# Patient Record
Sex: MALE | Race: WHITE | NOT HISPANIC OR LATINO | Employment: FULL TIME | URBAN - METROPOLITAN AREA
[De-identification: names, ages, dates, MRNs, and addresses within clinical notes are randomized per-mention and may not be internally consistent; named-entity substitution may affect disease eponyms.]

---

## 2023-08-10 LAB — RETINOPATHY: NORMAL

## 2023-08-12 ENCOUNTER — TRANSFERRED RECORDS (OUTPATIENT)
Dept: MULTI SPECIALTY CLINIC | Facility: CLINIC | Age: 38
End: 2023-08-12

## 2024-02-19 ENCOUNTER — APPOINTMENT (OUTPATIENT)
Dept: MRI IMAGING | Facility: HOSPITAL | Age: 39
End: 2024-02-19
Attending: EMERGENCY MEDICINE
Payer: COMMERCIAL

## 2024-02-19 ENCOUNTER — HOSPITAL ENCOUNTER (EMERGENCY)
Facility: HOSPITAL | Age: 39
Discharge: HOME | End: 2024-02-20
Attending: EMERGENCY MEDICINE | Admitting: EMERGENCY MEDICINE
Payer: COMMERCIAL

## 2024-02-19 VITALS
OXYGEN SATURATION: 100 % | HEIGHT: 66 IN | SYSTOLIC BLOOD PRESSURE: 141 MMHG | TEMPERATURE: 98.3 F | HEART RATE: 100 BPM | BODY MASS INDEX: 36.96 KG/M2 | RESPIRATION RATE: 16 BRPM | WEIGHT: 230 LBS | DIASTOLIC BLOOD PRESSURE: 74 MMHG

## 2024-02-19 DIAGNOSIS — M54.12 CERVICAL RADICULOPATHY: ICD-10-CM

## 2024-02-19 PROCEDURE — 250N000011 HC RX IP 250 OP 636: Performed by: EMERGENCY MEDICINE

## 2024-02-19 PROCEDURE — 99285 EMERGENCY DEPT VISIT HI MDM: CPT | Mod: 25

## 2024-02-19 PROCEDURE — 72141 MRI NECK SPINE W/O DYE: CPT

## 2024-02-19 PROCEDURE — 96372 THER/PROPH/DIAG INJ SC/IM: CPT | Performed by: EMERGENCY MEDICINE

## 2024-02-19 RX ORDER — KETOROLAC TROMETHAMINE 30 MG/ML
30 INJECTION, SOLUTION INTRAMUSCULAR; INTRAVENOUS ONCE
Status: COMPLETED | OUTPATIENT
Start: 2024-02-19 | End: 2024-02-19

## 2024-02-19 RX ORDER — METHOCARBAMOL 500 MG/1
500 TABLET, FILM COATED ORAL 4 TIMES DAILY PRN
Qty: 30 TABLET | Refills: 0 | Status: SHIPPED | OUTPATIENT
Start: 2024-02-19 | End: 2024-03-19

## 2024-02-19 RX ORDER — METHYLPREDNISOLONE 4 MG
TABLET, DOSE PACK ORAL
Qty: 21 TABLET | Refills: 0 | Status: SHIPPED | OUTPATIENT
Start: 2024-02-19 | End: 2024-03-19

## 2024-02-19 RX ADMIN — KETOROLAC TROMETHAMINE 30 MG: 30 INJECTION, SOLUTION INTRAMUSCULAR; INTRAVENOUS at 21:44

## 2024-02-19 ASSESSMENT — ACTIVITIES OF DAILY LIVING (ADL)
ADLS_ACUITY_SCORE: 33
ADLS_ACUITY_SCORE: 35

## 2024-02-19 NOTE — Clinical Note
Harrison Tolbert was seen and treated in our emergency department on 2/19/2024.  He may return to work on 02/22/2024.       If you have any questions or concerns, please don't hesitate to call.      Denzel Narayan, DO

## 2024-02-20 ENCOUNTER — TELEPHONE (OUTPATIENT)
Dept: NEUROSURGERY | Facility: CLINIC | Age: 39
End: 2024-02-20
Payer: COMMERCIAL

## 2024-02-20 NOTE — TELEPHONE ENCOUNTER
[facility] Call Center    Phone Message    May a detailed message be left on voicemail: yes     Reason for Call: Other: Patient was returning a call regarding the time slot on hold. He called to confirm that time and date would work. When  tried to book slot they kept getting an error message. Please book patient for 2/22/24 and call back.      Action Taken: Other: Neurosurgery    Travel Screening: Not Applicable

## 2024-02-20 NOTE — ED TRIAGE NOTES
Pt c/o pain from right side of neck into shoulder. Pt states it began after he was shoveling. Pain is worse with movement and it is intermittently making hand numb. Pt is worse with movement of arm or neck. Took ibuprofen at 1430 without relief.

## 2024-02-20 NOTE — TELEPHONE ENCOUNTER
Date: 2/20/2024  (Provide the date when patient was called)  Provider: FERNANDO- Martha Esqueda  (with whom  should patient dinorah with)  Reason for out-going call: new consult- ED follow up for Cervical Foraminal narrowing/disk osteophyte  (Reason patient was contacted)    Detailed message: ldvm for patient to c/b and dinorah new patient appt with FERNANDO for this coming Thursday 2/22/2024 held slot for patient (will remove hold slot at the then of today) @ 1:45 pm for check in time and 2:00 pm for appt time.

## 2024-02-20 NOTE — ED NOTES
Pt discharged ambulatory to home at 0000. All questions answered. Pt expressed understanding of info

## 2024-02-20 NOTE — ED PROVIDER NOTES
EMERGENCY DEPARTMENT ENCOUNTER      NAME: Harrison Tolbert  AGE: 38 year old male  YOB: 1985  MRN: [id number]  EVALUATION DATE & TIME: 2024  8:55 PM    PCP: System, Provider Not In    ED PROVIDER: Denzel Narayan D.O.      Chief Complaint   Patient presents with    Neck Pain       FINAL IMPRESSION:  1. Cervical radiculopathy        ED COURSE & MEDICAL DECISION MAKIN:27 PM I met with the patient to gather history and to perform my initial exam. I discussed the plan for care while in the Emergency Department.  11:43 PM I spoke with Dr. Asencio from neurosurgery. She recommends prescribing a Medrol Dose pack, discharge, and follow up in the morning as an out patient.  11:47 PM We discussed the plan for discharge and the patient is agreeable. Reviewed supportive cares, symptomatic treatment, outpatient follow up, and reasons to return to the Emergency Department. All questions and concerns were addressed. Patient to be discharged by ED RN.           Pertinent Labs & Imaging studies reviewed. (See chart for details)  38 year old male presents to the Emergency Department for evaluation of neck pain with radiation down right arm with positive Spurling's test.  Initial differential included discitis, cervical radiculopathy, epidural abscess, epidural hematoma, other acute process.  No history of trauma that would suggest suspicion for fracture or need for CT cervical spine.  MRI imaging does not show obvious abscess or hematoma, nor does it show evidence of discitis, but does show obvious foraminal stenosis secondary to cervical disc that would explain his symptoms quite well.  Discussed patient with neurosurgery, and they will follow him up in clinic and recommended discharge on Medrol Dosepak.  Patient was comfortable with this plan.  Return precautions were discussed.    Medical Decision Making  Obtained supplemental history:Supplemental history obtained?: No  Reviewed external records:  External records reviewed?: No  Care impacted by chronic illness:N/A  Care significantly affected by social determinants of health:N/A  Did you consider but not order tests?: Work up considered but not performed and documented in chart, if applicable  Did you interpret images independently?: Independent interpretation of ECG and images noted in documentation, when applicable.  Consultation discussion with other provider:Did you involve another provider (consultant, , pharmacy, etc.)?: I discussed the care with another health care provider, see documentation for details.  Discharge. I prescribed additional prescription strength medication(s) as charted. See documentation for any additional details.    At the conclusion of the encounter I discussed the results of all of the tests and the disposition. The questions were answered. The patient or family acknowledged understanding and was agreeable with the care plan.        HPI    Patient information was obtained from: patient     Use of : N/A        Harrison Tolbert is a 38 year old male who presents with neck pain.    The patient presents with right sided neck pain, shoulder pain, and right finger tingling since 2/14 when he was shoveling snow. He has mid back pain as well if he tries to straighten his back. Today, the pain was bad enough that he had to leave work early and is concerned he will not be able to work if this pain continues. He took ibuprofen this morning.       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       REVIEW OF SYSTEMS  Constitutional:  Denies fever, chills, weight loss or weakness  Eyes:  No pain, discharge, redness  HENT:  Denies sore throat, ear pain, congestion. Positive for neck pain.   Respiratory: No SOB, wheeze or cough  Cardiovascular:  No CP, palpitations  GI:  Denies abdominal pain, nausea, vomiting, diarrhea  : Denies dysuria, hematuria  Musculoskeletal:  Denies any new swelling or loss of  "function.  Skin:  Denies rash, pallor  Neurologic:  Denies headache, focal weakness. Positive for tingling (right fingers)  Lymph: Denies swollen nodes    All other systems negative unless noted in HPI.    PAST MEDICAL HISTORY:  No past medical history on file.    PAST SURGICAL HISTORY:  No past surgical history on file.      CURRENT MEDICATIONS:    No current facility-administered medications for this encounter.     Current Outpatient Medications   Medication    methocarbamol (ROBAXIN) 500 MG tablet    methylPREDNISolone (MEDROL DOSEPAK) 4 MG tablet therapy pack         ALLERGIES:  No Known Allergies    FAMILY HISTORY:  No family history on file.    SOCIAL HISTORY:  Social History     Socioeconomic History    Marital status: Single       VITALS:  Patient Vitals for the past 24 hrs:   BP Temp Temp src Pulse Resp SpO2 Height Weight   02/19/24 2354 (!) 141/74 -- -- 100 16 100 % -- --   02/19/24 1950 (!) 146/76 98.3  F (36.8  C) Temporal 105 18 100 % 1.664 m (5' 5.5\") 104.3 kg (230 lb)       PHYSICAL EXAM    VITAL SIGNS: BP (!) 141/74   Pulse 100   Temp 98.3  F (36.8  C) (Temporal)   Resp 16   Ht 1.664 m (5' 5.5\")   Wt 104.3 kg (230 lb)   SpO2 100%   BMI 37.69 kg/m      Vitals: BP (!) 141/74   Pulse 100   Temp 98.3  F (36.8  C) (Temporal)   Resp 16   Ht 1.664 m (5' 5.5\")   Wt 104.3 kg (230 lb)   SpO2 100%   BMI 37.69 kg/m    General: Appears in no acute distress, awake, alert, interactive.  Eyes: Conjunctivae non-injected. Sclera anicteric.  HENT: Atraumatic, normocephalic Positive Spurling sign son the right.   Neck: Supple, normal ROM  Respiratory/Chest: Respiration unlabored, no tachypnea  Abdomen: non distended  Musculoskeletal: Normal extremities. No edema or erythema.  Skin: Normal color. No rash or diaphoresis.  Neurologic: Alert and oriented, face symmetric, moves all extremities spontaneously. Speech clear.  Psychiatric:  Affect normal, Judgment normal, Mood normal.        LABS  Results for " orders placed or performed during the hospital encounter of 02/19/24 (from the past 24 hour(s))   Cervical spine MRI w/o contrast    Narrative    EXAM: MR CERVICAL SPINE W/O CONTRAST  LOCATION: [facility]  DATE: 2/19/2024    INDICATION: Reproducible right sided cervical radiculopathy  COMPARISON: None.  TECHNIQUE: MRI Cervical Spine without IV contrast.    FINDINGS:   Vertebral body height and marrow signal is normal. There is slight retrolisthesis of C3, C4 and C5. No abnormal cord signal. No extraspinal abnormality.    Craniovertebral junction and C1-C2: Normal.    C2-C3: Normal disc height. No herniation. Normal facets. No spinal canal or neural foraminal stenosis.     C3-C4: Slight retrolisthesis of C3. Mild loss of disc height. Small central protrusion. Normal facets. No spinal canal or neural foraminal narrowing.     C4-C5: Slight loss of disc height. Small central disc protrusion. No spinal canal or neural foraminal narrowing.     C5-C6: Moderate loss of disc height. Slight retrolisthesis of C5. Shallow right central disc osteophyte complex. Right-sided uncinate spurring. There is normal facets. No spinal canal or left neural foraminal narrowing. Severe right neural foraminal   narrowing.     C6-C7: Normal disc height. No herniation. Normal facets. No spinal canal or neural foraminal stenosis.     C7-T1: Normal disc height. No herniation. Normal facets. No spinal canal or neural foraminal stenosis.      Impression    IMPRESSION:  1.  At C5-C6 there is severe right neural foraminal narrowing secondary to a shallow right central disc osteophyte complex and right-sided uncinate spurring. Correlate for right C6 radiculopathy.  2.  Minor degenerative changes elsewhere as described.           RADIOLOGY  Cervical spine MRI w/o contrast   Final Result   IMPRESSION:   1.  At C5-C6 there is severe right neural foraminal narrowing secondary to a shallow right central disc osteophyte complex and  right-sided uncinate spurring. Correlate for right C6 radiculopathy.   2.  Minor degenerative changes elsewhere as described.                    MEDICATIONS GIVEN IN THE EMERGENCY:  Medications   ketorolac (TORADOL) injection 30 mg (30 mg Intramuscular $Given 2/19/24 6003)       NEW PRESCRIPTIONS STARTED AT TODAY'S ER VISIT  Discharge Medication List as of 2/19/2024 11:58 PM        START taking these medications    Details   methocarbamol (ROBAXIN) 500 MG tablet Take 1 tablet (500 mg) by mouth 4 times daily as needed for muscle spasms, Disp-30 tablet, R-0, Local Print      methylPREDNISolone (MEDROL DOSEPAK) 4 MG tablet therapy pack Follow Package Directions, Disp-21 tablet, R-0, Local Print              I, Chris Sarmiento, am serving as a scribe to document services personally performed by Denzel Narayan D.O., based on my observations and the provider's statements to me.  I, Denzel Narayan D.O., attest that Chris Sarmiento is acting in a scribe capacity, has observed my performance of the services and has documented them in accordance with my direction.     Denzel Narayan D.O.  Emergency Medicine  [facility] EMERGENCY DEPARTMENT  [address] [place] [id number] [phone]  Dept: [phone]       Denzel Narayan,   02/20/24 0021

## 2024-02-20 NOTE — PROGRESS NOTES
Contacted regarding 37 yo male presenting to ER with right arm pain.  MRI reveals foraminal narrowing on the right at C5-C6 due to HNP/osteophyte    Cervical MRI:  IMPRESSION:  1.  At C5-C6 there is severe right neural foraminal narrowing secondary to a shallow right central disc osteophyte complex and right-sided uncinate spurring. Correlate for right C6 radiculopathy.  2.  Minor degenerative changes elsewhere as described.     RECOMMENDATIONS:  Okay to discharge from ER.  Medrol dose pack.  NS office will contact patient tomorrow for clinic appt for evaluation.    AMNA Lopez  [facility] Neurosurgery  [address] [address]    Tel [phone]  Pager [phone]

## 2024-02-20 NOTE — CONFIDENTIAL NOTE
NEUROSURGERY- NEW PREVISIT PLANNING       Record Status/Location     Referring Provider Referral   Denzel Narayan DO      Diagnosis Referral M54.12 (ICD-10-CM) - Cervical radiculopathy    MRI (HEAD, NECK, SPINE) Pacs Cervical 2/19/24 [facility] [place]   CT Na    X-ray Na    INJECTION Na    PHYSICAL THERAPY Na    SURGERY Na

## 2024-02-21 NOTE — PROGRESS NOTES
"[facility] Neurosurgery  Neurosurgery Clinic Visit      CC: Cervical Radiculopathy     Primary care Provider: System, Provider Not In    HPI: Harrison Tolbert is a 38 year old male history of right carpal tunnel release evaluated in the Emergency Department 2/19/2024 for neck pain, right shoulder pain, and right arm pain that began 2/14/2024 while shoveling snow. Patient had MRI completed in ED demonstrating C5-C6 right severe foraminal stenosis secondary to disc osteophyte. Patient was discharged from ED with MDP, Robaxin and advised to follow up in outpatient NSGY clinic.    Today patient has ongoing severe neck pain, right shoulder and right upper extremity pain. Currently on day 3 of MDP and has been taking Robaxin 4 times per day without improvement in symptoms. Pain stems from neck radiating to right shoulder, right arm and diffusely into right hand with paresthesias of the right hand. No left upper extremity symptoms. No overt weakness. No recent trauma/fall. Patient works as a  requiring occasional heavy lifting which he is unable to do due to the intensity of his pain.       No past medical history on file.    Past Medical History reviewed with patient during visit.    No past surgical history on file.  Past Surgical History reviewed with patient during visit.    Current Outpatient Medications   Medication    meloxicam (MOBIC) 15 MG tablet    methocarbamol (ROBAXIN) 500 MG tablet    methylPREDNISolone (MEDROL DOSEPAK) 4 MG tablet therapy pack     No current facility-administered medications for this visit.       No Known Allergies    Social History     Socioeconomic History    Marital status: Single       No family history on file.      ROS: 10 point ROS neg other than the symptoms noted above in the HPI.    Vital Signs:   /89 (BP Location: Left arm, Patient Position: Sitting, Cuff Size: Adult Regular)   Pulse 97   Ht 5' 5.5\" (1.664 m)   Wt 223 lb (101.2 kg)   SpO2 " 97%   BMI 36.54 kg/m        Examination:  Constitutional:  Alert, well nourished, NAD.  Memory: recent and remote memory   HEENT: Normocephalic, atraumatic.   Pulm:  Without shortness of breath   CV:  No pitting edema of BLE.      Neurological:  Awake  Alert  Oriented x 3  Speech clear    Motor exam:   Shoulder Abduction:   Right:  5/5   Left:  5/5  Biceps:                        Right:  5/5   Left:  5/5  Triceps:                       Right:  5/5   Left:  5/5  Wrist Extensors:          Right:  5/5   Left:  5/5  Wrist Flexors:              Right:  5/5   Left:  5/5  Intrinsics:                     Right:  5/5   Left:  5/5  Hip Flexion:                 Right: 5/5  Left:  5/5  Knee Flexion:              Right:  5/5  Left:  5/5  Knee Extension:          Right:  5/5  Left:  5/5  Plantar Flexion:           Right:  5/5  Left:  5/5  Dorsal Flexion:            Right:  5/5  Left:  5/5    Sensation normal to bilateral upper and lower extremities  Muscle tone to bilateral upper and lower extremities   Gait: Able to stand from a seated position. Normal non-antalgic, non-myelopathic gait.    Cervical examination reveals limited range of motion.    Tenderness to palpation over cervical paraspinal muscles.   Johnson's negative bilaterally.   Right carpal tunnel release incision well healed.       Imaging:   Imaging Interpretation provided by radiologist.   EXAM: MR CERVICAL SPINE W/O CONTRAST  LOCATION: [facility]  DATE: 2/19/2024  INDICATION: Reproducible right sided cervical radiculopathy  COMPARISON: None.  TECHNIQUE: MRI Cervical Spine without IV contrast.     FINDINGS:   Vertebral body height and marrow signal is normal. There is slight retrolisthesis of C3, C4 and C5. No abnormal cord signal. No extraspinal abnormality.     Craniovertebral junction and C1-C2: Normal.     C2-C3: Normal disc height. No herniation. Normal facets. No spinal canal or neural foraminal stenosis.      C3-C4: Slight  retrolisthesis of C3. Mild loss of disc height. Small central protrusion. Normal facets. No spinal canal or neural foraminal narrowing.      C4-C5: Slight loss of disc height. Small central disc protrusion. No spinal canal or neural foraminal narrowing.      C5-C6: Moderate loss of disc height. Slight retrolisthesis of C5. Shallow right central disc osteophyte complex. Right-sided uncinate spurring. There is normal facets. No spinal canal or left neural foraminal narrowing. Severe right neural foraminal   narrowing.      C6-C7: Normal disc height. No herniation. Normal facets. No spinal canal or neural foraminal stenosis.      C7-T1: Normal disc height. No herniation. Normal facets. No spinal canal or neural foraminal stenosis.                                                            IMPRESSION:  1.  At C5-C6 there is severe right neural foraminal narrowing secondary to a shallow right central disc osteophyte complex and right-sided uncinate spurring. Correlate for right C6 radiculopathy.  2.  Minor degenerative changes elsewhere as described.      Assessment/Plan:   Harrison Tolbert is a 38 year old male history of right carpal tunnel release evaluated in the Emergency Department 2/19/2024 for neck pain, right shoulder pain, and right arm pain that began 2/14/2024 while shoveling snow. Patient had MRI completed in ED demonstrating C5-C6 right severe foraminal stenosis secondary to disc osteophyte. Patient was discharged from ED with MDP, Robaxin and advised to follow up in outpatient NSGY clinic. Patient has not had improvement in his symptoms with Robaxin and MDP. Imaging reviewed and discussed with patient.     Discussed plan to continue medication provided by ED, and I will prescribed Mobic. Placed order for Right C5-C6 TFESI. Educated patient about how injections work and to monitor symptoms afterwards. Advised patient to contact clinic if he does not have improvement of his symptoms within 2 weeks of  injection. If he does have improvement of symptoms, can consider starting PT to further assist in treatment. However, will hold on starting PT at this time due to severity of patients symptoms. If he does NOT have improvement of symptoms, plan to obtain cervical flexion extension and then schedule patient to discuss surgical intervention with Dr. Asencio - possible foraminotomy or disc replacement.     Discussed patient and plan with Dr. Asencio.     - Ordered Right C5-C6 TFESI, plan follow up telephone/in person appointment 2 weeks after to discuss injection results  - Can discuss starting PT after injection due to severity of pain  - If no improvement with injection, obtain cervical Flexion Extension and schedule with Dr. Asencio to discuss surgical intervention   - Placed prescription for Mobic, if he needs further pain medication will need to contact PCP   - Provided work note for light activity - limit lifting to less than 10-15 pounds    Call the clinic at [phone] for any other questions and concerns.      Patient verbalized understanding and is in agreement with the plan.    Patient Instructions   Schedule Injection    Follow up appointment 2 weeks after injection     Martha Esqueda PA-C  [facility] Neurosurgery  [address] [address]

## 2024-02-22 ENCOUNTER — OFFICE VISIT (OUTPATIENT)
Dept: NEUROSURGERY | Facility: CLINIC | Age: 39
End: 2024-02-22
Payer: COMMERCIAL

## 2024-02-22 ENCOUNTER — PRE VISIT (OUTPATIENT)
Dept: NEUROSURGERY | Facility: CLINIC | Age: 39
End: 2024-02-22

## 2024-02-22 VITALS
OXYGEN SATURATION: 97 % | BODY MASS INDEX: 35.84 KG/M2 | HEART RATE: 97 BPM | DIASTOLIC BLOOD PRESSURE: 89 MMHG | WEIGHT: 223 LBS | SYSTOLIC BLOOD PRESSURE: 135 MMHG | HEIGHT: 66 IN

## 2024-02-22 DIAGNOSIS — M54.12 RIGHT CERVICAL RADICULOPATHY: Primary | ICD-10-CM

## 2024-02-22 DIAGNOSIS — M54.12 CERVICAL RADICULOPATHY: ICD-10-CM

## 2024-02-22 PROCEDURE — 99203 OFFICE O/P NEW LOW 30 MIN: CPT

## 2024-02-22 RX ORDER — MELOXICAM 15 MG/1
7.5 TABLET ORAL DAILY
Qty: 15 TABLET | Refills: 0 | Status: SHIPPED | OUTPATIENT
Start: 2024-02-22 | End: 2024-03-19

## 2024-02-22 ASSESSMENT — PAIN SCALES - GENERAL: PAINLEVEL: WORST PAIN (10)

## 2024-02-22 NOTE — LETTER
2/22/2024         RE: Harrison Tolbert  [address]  [place] [address]        Dear Colleague,    Thank you for referring your patient, Harrison Tolbert, to the [facility]. Please see a copy of my visit note below.    [facility] Neurosurgery  Neurosurgery Clinic Visit      CC: Cervical Radiculopathy     Primary care Provider: System, Provider Not In    HPI: Harrison Tolbert is a 38 year old male history of right carpal tunnel release evaluated in the Emergency Department 2/19/2024 for neck pain, right shoulder pain, and right arm pain that began 2/14/2024 while shoveling snow. Patient had MRI completed in ED demonstrating C5-C6 right severe foraminal stenosis secondary to disc osteophyte. Patient was discharged from ED with MDP, Robaxin and advised to follow up in outpatient NSGY clinic.    Today patient has ongoing severe neck pain, right shoulder and right upper extremity pain. Currently on day 3 of MDP and has been taking Robaxin 4 times per day without improvement in symptoms. Pain stems from neck radiating to right shoulder, right arm and diffusely into right hand with paresthesias of the right hand. No left upper extremity symptoms. No overt weakness. No recent trauma/fall. Patient works as a  requiring occasional heavy lifting which he is unable to do due to the intensity of his pain.       No past medical history on file.    Past Medical History reviewed with patient during visit.    No past surgical history on file.  Past Surgical History reviewed with patient during visit.    Current Outpatient Medications   Medication     meloxicam (MOBIC) 15 MG tablet     methocarbamol (ROBAXIN) 500 MG tablet     methylPREDNISolone (MEDROL DOSEPAK) 4 MG tablet therapy pack     No current facility-administered medications for this visit.       No Known Allergies    Social History     Socioeconomic History     Marital status:  "Single       No family history on file.      ROS: 10 point ROS neg other than the symptoms noted above in the HPI.    Vital Signs:   /89 (BP Location: Left arm, Patient Position: Sitting, Cuff Size: Adult Regular)   Pulse 97   Ht 5' 5.5\" (1.664 m)   Wt 223 lb (101.2 kg)   SpO2 97%   BMI 36.54 kg/m        Examination:  Constitutional:  Alert, well nourished, NAD.  Memory: recent and remote memory   HEENT: Normocephalic, atraumatic.   Pulm:  Without shortness of breath   CV:  No pitting edema of BLE.      Neurological:  Awake  Alert  Oriented x 3  Speech clear    Motor exam:   Shoulder Abduction:   Right:  5/5   Left:  5/5  Biceps:                        Right:  5/5   Left:  5/5  Triceps:                       Right:  5/5   Left:  5/5  Wrist Extensors:          Right:  5/5   Left:  5/5  Wrist Flexors:              Right:  5/5   Left:  5/5  Intrinsics:                     Right:  5/5   Left:  5/5  Hip Flexion:                 Right: 5/5  Left:  5/5  Knee Flexion:              Right:  5/5  Left:  5/5  Knee Extension:          Right:  5/5  Left:  5/5  Plantar Flexion:           Right:  5/5  Left:  5/5  Dorsal Flexion:            Right:  5/5  Left:  5/5    Sensation normal to bilateral upper and lower extremities  Muscle tone to bilateral upper and lower extremities   Gait: Able to stand from a seated position. Normal non-antalgic, non-myelopathic gait.    Cervical examination reveals limited range of motion.    Tenderness to palpation over cervical paraspinal muscles.   Johnson's negative bilaterally.   Right carpal tunnel release incision well healed.       Imaging:   Imaging Interpretation provided by radiologist.   EXAM: MR CERVICAL SPINE W/O CONTRAST  LOCATION: [facility]  DATE: 2/19/2024  INDICATION: Reproducible right sided cervical radiculopathy  COMPARISON: None.  TECHNIQUE: MRI Cervical Spine without IV contrast.     FINDINGS:   Vertebral body height and marrow signal is " normal. There is slight retrolisthesis of C3, C4 and C5. No abnormal cord signal. No extraspinal abnormality.     Craniovertebral junction and C1-C2: Normal.     C2-C3: Normal disc height. No herniation. Normal facets. No spinal canal or neural foraminal stenosis.      C3-C4: Slight retrolisthesis of C3. Mild loss of disc height. Small central protrusion. Normal facets. No spinal canal or neural foraminal narrowing.      C4-C5: Slight loss of disc height. Small central disc protrusion. No spinal canal or neural foraminal narrowing.      C5-C6: Moderate loss of disc height. Slight retrolisthesis of C5. Shallow right central disc osteophyte complex. Right-sided uncinate spurring. There is normal facets. No spinal canal or left neural foraminal narrowing. Severe right neural foraminal   narrowing.      C6-C7: Normal disc height. No herniation. Normal facets. No spinal canal or neural foraminal stenosis.      C7-T1: Normal disc height. No herniation. Normal facets. No spinal canal or neural foraminal stenosis.                                                            IMPRESSION:  1.  At C5-C6 there is severe right neural foraminal narrowing secondary to a shallow right central disc osteophyte complex and right-sided uncinate spurring. Correlate for right C6 radiculopathy.  2.  Minor degenerative changes elsewhere as described.      Assessment/Plan:   Harrison Tolbert is a 38 year old male history of right carpal tunnel release evaluated in the Emergency Department 2/19/2024 for neck pain, right shoulder pain, and right arm pain that began 2/14/2024 while shoveling snow. Patient had MRI completed in ED demonstrating C5-C6 right severe foraminal stenosis secondary to disc osteophyte. Patient was discharged from ED with MDP, Robaxin and advised to follow up in outpatient NSGY clinic. Patient has not had improvement in his symptoms with Robaxin and MDP. Imaging reviewed and discussed with patient.     Discussed plan  to continue medication provided by ED, and I will prescribed Mobic. Placed order for Right C5-C6 TFESI. Educated patient about how injections work and to monitor symptoms afterwards. Advised patient to contact clinic if he does not have improvement of his symptoms within 2 weeks of injection. If he does have improvement of symptoms, can consider starting PT to further assist in treatment. However, will hold on starting PT at this time due to severity of patients symptoms. If he does NOT have improvement of symptoms, plan to obtain cervical flexion extension and then schedule patient to discuss surgical intervention with Dr. Asencio - possible foraminotomy or disc replacement.     Discussed patient and plan with Dr. Asencio.     - Ordered Right C5-C6 TFESI, plan follow up telephone/in person appointment 2 weeks after to discuss injection results  - Can discuss starting PT after injection due to severity of pain  - If no improvement with injection, obtain cervical Flexion Extension and schedule with Dr. Asencio to discuss surgical intervention   - Placed prescription for Mobic, if he needs further pain medication will need to contact PCP   - Provided work note for light activity - limit lifting to less than 10-15 pounds    Call the clinic at [phone] for any other questions and concerns.      Patient verbalized understanding and is in agreement with the plan.    Patient Instructions   Schedule Injection    Follow up appointment 2 weeks after injection     Martha Esqueda PA-C  [facility] Neurosurgery  [address] [address]        Again, thank you for allowing me to participate in the care of your patient.        Sincerely,        Martha Esqueda PA-C

## 2024-02-22 NOTE — LETTER
February 22, 2024      Harrison Tolbert  [address]   [place]  [place] [address]        To Whom It May Concern:    Harrison Tolbert was seen in our clinic. He may return to work with the following: limited to light duty - lifting no greater than 10-15 pounds starting on 2/22/2024. Will re-evaluate patients symptoms and activity restrictions at next clinic appointment.       Sincerely,      Martha Esqueda

## 2024-03-08 ENCOUNTER — HOSPITAL ENCOUNTER (EMERGENCY)
Facility: HOSPITAL | Age: 39
Discharge: HOME | End: 2024-03-08
Attending: EMERGENCY MEDICINE | Admitting: EMERGENCY MEDICINE
Payer: COMMERCIAL

## 2024-03-08 VITALS
OXYGEN SATURATION: 99 % | HEART RATE: 111 BPM | WEIGHT: 221.5 LBS | HEIGHT: 65 IN | DIASTOLIC BLOOD PRESSURE: 79 MMHG | SYSTOLIC BLOOD PRESSURE: 135 MMHG | BODY MASS INDEX: 36.9 KG/M2 | RESPIRATION RATE: 19 BRPM | TEMPERATURE: 97.9 F

## 2024-03-08 DIAGNOSIS — L03.90 CELLULITIS, UNSPECIFIED CELLULITIS SITE: ICD-10-CM

## 2024-03-08 PROCEDURE — 99283 EMERGENCY DEPT VISIT LOW MDM: CPT

## 2024-03-08 PROCEDURE — 250N000013 HC RX MED GY IP 250 OP 250 PS 637: Performed by: EMERGENCY MEDICINE

## 2024-03-08 RX ORDER — CEPHALEXIN 500 MG/1
500 CAPSULE ORAL 4 TIMES DAILY
Qty: 40 CAPSULE | Refills: 0 | Status: SHIPPED | OUTPATIENT
Start: 2024-03-08 | End: 2024-03-18

## 2024-03-08 RX ORDER — IBUPROFEN 800 MG/1
800 TABLET, FILM COATED ORAL EVERY 8 HOURS PRN
Qty: 24 TABLET | Refills: 0 | Status: SHIPPED | OUTPATIENT
Start: 2024-03-08 | End: 2024-03-16

## 2024-03-08 RX ORDER — CEPHALEXIN 500 MG/1
500 CAPSULE ORAL ONCE
Status: COMPLETED | OUTPATIENT
Start: 2024-03-08 | End: 2024-03-08

## 2024-03-08 RX ORDER — IBUPROFEN 600 MG/1
600 TABLET, FILM COATED ORAL ONCE
Status: COMPLETED | OUTPATIENT
Start: 2024-03-08 | End: 2024-03-08

## 2024-03-08 RX ADMIN — CEPHALEXIN 500 MG: 500 CAPSULE ORAL at 21:02

## 2024-03-08 RX ADMIN — IBUPROFEN 600 MG: 600 TABLET, FILM COATED ORAL at 21:03

## 2024-03-08 ASSESSMENT — COLUMBIA-SUICIDE SEVERITY RATING SCALE - C-SSRS
1. IN THE PAST MONTH, HAVE YOU WISHED YOU WERE DEAD OR WISHED YOU COULD GO TO SLEEP AND NOT WAKE UP?: NO
6. HAVE YOU EVER DONE ANYTHING, STARTED TO DO ANYTHING, OR PREPARED TO DO ANYTHING TO END YOUR LIFE?: NO
2. HAVE YOU ACTUALLY HAD ANY THOUGHTS OF KILLING YOURSELF IN THE PAST MONTH?: NO

## 2024-03-08 ASSESSMENT — ACTIVITIES OF DAILY LIVING (ADL): ADLS_ACUITY_SCORE: 35

## 2024-03-09 NOTE — ED TRIAGE NOTES
Arrives reporting possible cyst on left side of head, at hairline. Reports he noticed it about 1 week to 1.5 weeks ago. Thought it might be ingrown hair. States pain is much greater than 10/10. Sitting on phone, no signs of distress.     Area observed to be swollen, skin tight.      Triage Assessment (Adult)       Row Name 03/08/24 2018          Triage Assessment    Airway WDL WDL        Respiratory WDL    Respiratory WDL WDL        Skin Circulation/Temperature WDL    Skin Circulation/Temperature WDL WDL        Cardiac WDL    Cardiac WDL WDL        Peripheral/Neurovascular WDL    Peripheral Neurovascular WDL WDL        Cognitive/Neuro/Behavioral WDL    Cognitive/Neuro/Behavioral WDL WDL

## 2024-03-09 NOTE — ED PROVIDER NOTES
EMERGENCY DEPARTMENT ENCOUNTER      NAME: Harrison Tolbert  AGE: 38 year old male  YOB: 1985  MRN: [id number]  EVALUATION DATE & TIME: 3/8/2024  8:43 PM    PCP: System, Provider Not In    ED PROVIDER: Alexa Pelletier M.D.      Chief Complaint   Patient presents with    possible cyst         FINAL IMPRESSION:  1. Cellulitis, unspecified cellulitis site          ED COURSE & MEDICAL DECISION MAKIN:45 PM I met with the patient, obtained history, performed an initial exam, and discussed options and plan for diagnostics and treatment here in the ED.  ED Course as of 03/08/24 2123   Fri Mar 08, 2024   2051 Patient with occipital region with recently identified ingrown hair likely region without fluctuance, some regional induration/erythema and redness, begun on ibuprofen with keflex, VS WNL and afebrile reassringly. Patient discharged after being provided with extensive anticipatory guidance and given return precautions, importance of PMD follow-up emphasized.        Pertinent Labs & Imaging studies reviewed. (See chart for details)    Medical Decision Making  Obtained supplemental history:Supplemental history obtained?: Documented in chart  Reviewed external records: External records reviewed?: Documented in chart  Care impacted by chronic illness:N/A  Care significantly affected by social determinants of health:N/A  Did you consider but not order tests?: Work up considered but not performed and documented in chart, if applicable  Did you interpret images independently?: Independent interpretation of ECG and images noted in documentation, when applicable.  Consultation discussion with other provider:Did you involve another provider (consultant, , pharmacy, etc.)?: No  Discharge. I prescribed additional prescription strength medication(s) as charted. See documentation for any additional details.    At the conclusion of the encounter I discussed the results of all of the tests and the  disposition. The questions were answered. The patient or family acknowledged understanding and was agreeable with the care plan.     MEDICATIONS GIVEN IN THE EMERGENCY:  Medications   ibuprofen (ADVIL/MOTRIN) tablet 600 mg (600 mg Oral $Given 3/8/24 2103)   cephALEXin (KEFLEX) capsule 500 mg (500 mg Oral $Given 3/8/24 2102)       NEW PRESCRIPTIONS STARTED AT TODAY'S ER VISIT  Discharge Medication List as of 3/8/2024  8:57 PM        START taking these medications    Details   cephALEXin (KEFLEX) 500 MG capsule Take 1 capsule (500 mg) by mouth 4 times daily for 10 days, Disp-40 capsule, R-0, E-Prescribe      ibuprofen (ADVIL/MOTRIN) 800 MG tablet Take 1 tablet (800 mg) by mouth every 8 hours as needed, Disp-24 tablet, R-0, E-Prescribe                =================================================================    [facility]      Harrison Tolbert is a 38 year old male with no PMHx who presents to the ED today via private vehicle with a possible cyst.    Patient reports having a bump on the back of his head for the past week. He rates the pain a 9/10, and notes that he took ibuprofen yesterday. He denies any pus leakage, trauma, or fever. No other complaints at this time.       REVIEW OF SYSTEMS   All other systems reviewed and are negative except as noted above in HPI.    PAST MEDICAL HISTORY:  No past medical history on file.    PAST SURGICAL HISTORY:  No past surgical history on file.    CURRENT MEDICATIONS:    cephALEXin (KEFLEX) 500 MG capsule  ibuprofen (ADVIL/MOTRIN) 800 MG tablet  meloxicam (MOBIC) 15 MG tablet  methocarbamol (ROBAXIN) 500 MG tablet  methylPREDNISolone (MEDROL DOSEPAK) 4 MG tablet therapy pack        ALLERGIES:  No Known Allergies    FAMILY HISTORY:  No family history on file.    SOCIAL HISTORY:   Social History     Socioeconomic History    Marital status: Single   Tobacco Use    Smoking status: Former     Packs/day: 0.50     Years: 8.00     Additional pack years: 0.00     Total pack years:  "4.00     Types: Cigarettes     Quit date: 2023     Years since quittin.1     Passive exposure: Current    Smokeless tobacco: Former       VITALS:  Patient Vitals for the past 24 hrs:   BP Temp Pulse Resp SpO2 Height Weight   24 -- -- -- -- -- 1.651 m (5' 5\") --   24 135/79 97.9  F (36.6  C) 111 19 99 % -- 100.5 kg (221 lb 8 oz)       PHYSICAL EXAM    GENERAL: Awake, alert.  In no acute distress.   HEENT: Normocephalic, atraumatic.  Pupils equal, round and reactive.  Conjunctiva normal.  EOMI.  NECK: No stridor or apparent deformity.  EXTREMITIES: No lower extremity swelling or edema.    NEURO: Alert and oriented to person, place and time.  Cranial nerves grossly intact.  No focal motor deficit.  PSYCH: Normal mood and affect  SKIN: No rashes. Posterior scalp occipital region of reddened induration, no fluctuance.             I, BETO SHAIKH , am serving as a scribe to document services personally performed by Dr. Alexa Pelletier based on my observation and the provider's statements to me. IAlexa MD attest that BETO SHAIKH  is acting in a scribe capacity, has observed my performance of the services and has documented them in accordance with my direction.       Alexa Pelletier MD  24    "

## 2024-03-13 ENCOUNTER — APPOINTMENT (OUTPATIENT)
Dept: CT IMAGING | Facility: HOSPITAL | Age: 39
End: 2024-03-13
Attending: FAMILY MEDICINE
Payer: COMMERCIAL

## 2024-03-13 ENCOUNTER — HOSPITAL ENCOUNTER (EMERGENCY)
Facility: HOSPITAL | Age: 39
Discharge: HOME | End: 2024-03-13
Attending: FAMILY MEDICINE | Admitting: FAMILY MEDICINE
Payer: COMMERCIAL

## 2024-03-13 VITALS
TEMPERATURE: 97.8 F | SYSTOLIC BLOOD PRESSURE: 125 MMHG | HEART RATE: 71 BPM | RESPIRATION RATE: 18 BRPM | BODY MASS INDEX: 35.52 KG/M2 | OXYGEN SATURATION: 98 % | WEIGHT: 221 LBS | HEIGHT: 66 IN | DIASTOLIC BLOOD PRESSURE: 62 MMHG

## 2024-03-13 DIAGNOSIS — M62.830 LUMBAR PARASPINAL MUSCLE SPASM: ICD-10-CM

## 2024-03-13 DIAGNOSIS — M54.50 ACUTE RIGHT-SIDED LOW BACK PAIN WITHOUT SCIATICA: ICD-10-CM

## 2024-03-13 DIAGNOSIS — E11.65 HYPERGLYCEMIA DUE TO DIABETES MELLITUS (H): ICD-10-CM

## 2024-03-13 LAB
ANION GAP SERPL CALCULATED.3IONS-SCNC: 8 MMOL/L (ref 7–15)
BASOPHILS # BLD AUTO: 0.1 10E3/UL (ref 0–0.2)
BASOPHILS NFR BLD AUTO: 1 %
BUN SERPL-MCNC: 15.9 MG/DL (ref 6–20)
CALCIUM SERPL-MCNC: 9.6 MG/DL (ref 8.6–10)
CHLORIDE SERPL-SCNC: 101 MMOL/L (ref 98–107)
CREAT SERPL-MCNC: 0.72 MG/DL (ref 0.67–1.17)
DEPRECATED HCO3 PLAS-SCNC: 27 MMOL/L (ref 22–29)
EGFRCR SERPLBLD CKD-EPI 2021: >90 ML/MIN/1.73M2
EOSINOPHIL # BLD AUTO: 0.4 10E3/UL (ref 0–0.7)
EOSINOPHIL NFR BLD AUTO: 6 %
ERYTHROCYTE [DISTWIDTH] IN BLOOD BY AUTOMATED COUNT: 11.9 % (ref 10–15)
GLUCOSE SERPL-MCNC: 368 MG/DL (ref 70–99)
HCT VFR BLD AUTO: 44.3 % (ref 40–53)
HGB BLD-MCNC: 15.1 G/DL (ref 13.3–17.7)
IMM GRANULOCYTES # BLD: 0 10E3/UL
IMM GRANULOCYTES NFR BLD: 0 %
LYMPHOCYTES # BLD AUTO: 2.8 10E3/UL (ref 0.8–5.3)
LYMPHOCYTES NFR BLD AUTO: 41 %
MCH RBC QN AUTO: 30.1 PG (ref 26.5–33)
MCHC RBC AUTO-ENTMCNC: 34.1 G/DL (ref 31.5–36.5)
MCV RBC AUTO: 88 FL (ref 78–100)
MONOCYTES # BLD AUTO: 0.6 10E3/UL (ref 0–1.3)
MONOCYTES NFR BLD AUTO: 9 %
NEUTROPHILS # BLD AUTO: 2.9 10E3/UL (ref 1.6–8.3)
NEUTROPHILS NFR BLD AUTO: 43 %
NRBC # BLD AUTO: 0 10E3/UL
NRBC BLD AUTO-RTO: 0 /100
PLATELET # BLD AUTO: 227 10E3/UL (ref 150–450)
POTASSIUM SERPL-SCNC: 4.5 MMOL/L (ref 3.4–5.3)
RBC # BLD AUTO: 5.01 10E6/UL (ref 4.4–5.9)
SODIUM SERPL-SCNC: 136 MMOL/L (ref 135–145)
WBC # BLD AUTO: 6.9 10E3/UL (ref 4–11)

## 2024-03-13 PROCEDURE — 250N000013 HC RX MED GY IP 250 OP 250 PS 637: Performed by: FAMILY MEDICINE

## 2024-03-13 PROCEDURE — 74176 CT ABD & PELVIS W/O CONTRAST: CPT

## 2024-03-13 PROCEDURE — 96374 THER/PROPH/DIAG INJ IV PUSH: CPT

## 2024-03-13 PROCEDURE — 96375 TX/PRO/DX INJ NEW DRUG ADDON: CPT

## 2024-03-13 PROCEDURE — 250N000012 HC RX MED GY IP 250 OP 636 PS 637: Performed by: FAMILY MEDICINE

## 2024-03-13 PROCEDURE — 85025 COMPLETE CBC W/AUTO DIFF WBC: CPT | Performed by: FAMILY MEDICINE

## 2024-03-13 PROCEDURE — 80048 BASIC METABOLIC PNL TOTAL CA: CPT | Performed by: FAMILY MEDICINE

## 2024-03-13 PROCEDURE — 250N000011 HC RX IP 250 OP 636: Performed by: FAMILY MEDICINE

## 2024-03-13 PROCEDURE — 258N000003 HC RX IP 258 OP 636: Performed by: FAMILY MEDICINE

## 2024-03-13 PROCEDURE — 36415 COLL VENOUS BLD VENIPUNCTURE: CPT | Performed by: FAMILY MEDICINE

## 2024-03-13 PROCEDURE — 99285 EMERGENCY DEPT VISIT HI MDM: CPT | Mod: 25

## 2024-03-13 RX ORDER — LORAZEPAM 0.5 MG/1
0.5 TABLET ORAL EVERY 6 HOURS PRN
Qty: 10 TABLET | Refills: 0 | Status: SHIPPED | OUTPATIENT
Start: 2024-03-13 | End: 2024-09-16

## 2024-03-13 RX ORDER — OXYCODONE HYDROCHLORIDE 5 MG/1
5 TABLET ORAL EVERY 6 HOURS PRN
Qty: 6 TABLET | Refills: 0 | Status: SHIPPED | OUTPATIENT
Start: 2024-03-13 | End: 2024-03-16

## 2024-03-13 RX ORDER — KETOROLAC TROMETHAMINE 15 MG/ML
15 INJECTION, SOLUTION INTRAMUSCULAR; INTRAVENOUS ONCE
Status: COMPLETED | OUTPATIENT
Start: 2024-03-13 | End: 2024-03-13

## 2024-03-13 RX ORDER — LORAZEPAM 2 MG/ML
0.5 INJECTION INTRAMUSCULAR ONCE
Status: COMPLETED | OUTPATIENT
Start: 2024-03-13 | End: 2024-03-13

## 2024-03-13 RX ORDER — OXYCODONE HYDROCHLORIDE 5 MG/1
5 TABLET ORAL ONCE
Status: COMPLETED | OUTPATIENT
Start: 2024-03-13 | End: 2024-03-13

## 2024-03-13 RX ADMIN — KETOROLAC TROMETHAMINE 15 MG: 15 INJECTION, SOLUTION INTRAMUSCULAR; INTRAVENOUS at 05:54

## 2024-03-13 RX ADMIN — OXYCODONE HYDROCHLORIDE 5 MG: 5 TABLET ORAL at 07:02

## 2024-03-13 RX ADMIN — SODIUM CHLORIDE 10 UNITS: 9 INJECTION, SOLUTION INTRAVENOUS at 07:02

## 2024-03-13 RX ADMIN — LORAZEPAM 0.5 MG: 2 INJECTION INTRAMUSCULAR; INTRAVENOUS at 06:55

## 2024-03-13 ASSESSMENT — COLUMBIA-SUICIDE SEVERITY RATING SCALE - C-SSRS
6. HAVE YOU EVER DONE ANYTHING, STARTED TO DO ANYTHING, OR PREPARED TO DO ANYTHING TO END YOUR LIFE?: NO
1. IN THE PAST MONTH, HAVE YOU WISHED YOU WERE DEAD OR WISHED YOU COULD GO TO SLEEP AND NOT WAKE UP?: NO
2. HAVE YOU ACTUALLY HAD ANY THOUGHTS OF KILLING YOURSELF IN THE PAST MONTH?: NO

## 2024-03-13 ASSESSMENT — ACTIVITIES OF DAILY LIVING (ADL)
ADLS_ACUITY_SCORE: 35
ADLS_ACUITY_SCORE: 35

## 2024-03-13 NOTE — ED PROVIDER NOTES
EMERGENCY DEPARTMENT ENCOUNTER      NAME: Harrison Tolbert  AGE: 38 year old male  YOB: 1985  MRN: [id number]  EVALUATION DATE & TIME: 3/13/2024  5:13 AM    PCP: System, Provider Not In    ED PROVIDER: Justen Ruth M.D.    Chief Complaint   Patient presents with    Back Pain       FINAL IMPRESSION:  1. Acute right-sided low back pain without sciatica    2. Lumbar paraspinal muscle spasm    3. Hyperglycemia due to diabetes mellitus (H)        ED COURSE & MEDICAL DECISION MAKING:    Pertinent Labs & Imaging studies independently interpreted by me. (See chart for details)  5:40 AM  Patient seen and examined, reviewed prior emergency department visit from February 19 when patient was seen with neck pain and found to have cervical radiculopathy, also reviewed prior neurosurgery visit from February 22 which was follow-up from that visit and patient was continued on Medrol Dosepak as well as Robaxin.  Patient presents today with right-sided back pain.  Says he slept on the sofa over the weekend and started having some mild pain but increased pain or night.  On exam here, appears to have occasional paroxysms of pain and says the pain does not radiate around to his abdomen.  Right low lumbar tenderness.  Likely represents musculoskeletal pain but given paroxysms and radiation to the abdomen, concern for possible kidney stone as well.  IV started, Toradol and Decadron given in the emergency department and CT of the abdomen pelvis ordered.  6:40 AM labs ordered and independently interpreted by me with hyperglycemia, otherwise reassuring basic metabolic panel and normal CBC.  CT abdomen pelvis independently interpreted by me does not demonstrate any obstructing ureteral stone, hydronephrosis, hydroureter, or other intra-abdominal abnormality to explain symptoms.  Minimal improvement after Toradol, Ativan and oxycodone ordered in the emergency department.  Patient also noted to be hyperglycemic and  regular insulin will be given prior to discharge.    At the conclusion of the encounter I discussed the results of all of the tests and the disposition. The questions were answered. The patient or family acknowledged understanding and was agreeable with the care plan.     Medical Decision Making  Obtained supplemental history:Supplemental history obtained?: Family Member/Significant Other  Reviewed external records: External records reviewed?: Documented in chart  Care impacted by chronic illness:Chronic Pain  Care significantly affected by social determinants of health:Access to Affordable Health Care  Did you consider but not order tests?: Work up considered but not performed and documented in chart, if applicable  Did you interpret images independently?: Independent interpretation of ECG and images noted in documentation, when applicable.  Consultation discussion with other provider:Did you involve another provider (consultant, , pharmacy, etc.)?: No  Discharge. I prescribed additional prescription strength medication(s) as charted. I considered admission, but discharged patient after significant clinical improvement.    MEDICATIONS GIVEN IN THE EMERGENCY:  Medications   ketorolac (TORADOL) injection 15 mg (15 mg Intravenous $Given 3/13/24 0554)   LORazepam (ATIVAN) injection 0.5 mg (0.5 mg Intravenous $Given 3/13/24 0655)   insulin regular 1 unit/mL injection 10 Units (10 Units Intravenous $Given 3/13/24 0702)   oxyCODONE (ROXICODONE) tablet 5 mg (5 mg Oral $Given 3/13/24 0702)       NEW PRESCRIPTIONS STARTED AT TODAY'S ER VISIT  New Prescriptions    LORAZEPAM (ATIVAN) 0.5 MG TABLET    Take 1 tablet (0.5 mg) by mouth every 6 hours as needed for muscle spasms    OXYCODONE (ROXICODONE) 5 MG TABLET    Take 1 tablet (5 mg) by mouth every 6 hours as needed for severe pain       =================================================================    HPI    Patient information was obtained from: patient, significant  "carlos a Tolbert is a 38 year old male with a pertinent history of cervical radiculopathy who presents to this ED for evaluation of back pain.  Patient noted back pain started day before yesterday after sleeping on the sofa, had some pain with movement yesterday but abruptly worse overnight.  Denies radiation into the leg, no bowel or bladder dysfunction.  Pain is worse with movement although patient cannot get comfortable in any position and has occasional paroxysms of pain that radiate into the abdomen.  Denies dysuria or hematuria.  Has taken ibuprofen and Robaxin for this.      REVIEW OF SYSTEMS   Review of Systems   All other systems reviewed and negative    PAST MEDICAL HISTORY:  No past medical history on file.    PAST SURGICAL HISTORY:  No past surgical history on file.    CURRENT MEDICATIONS:    No current facility-administered medications for this encounter.     Current Outpatient Medications   Medication    LORazepam (ATIVAN) 0.5 MG tablet    oxyCODONE (ROXICODONE) 5 MG tablet    cephALEXin (KEFLEX) 500 MG capsule    ibuprofen (ADVIL/MOTRIN) 800 MG tablet    meloxicam (MOBIC) 15 MG tablet    methocarbamol (ROBAXIN) 500 MG tablet    methylPREDNISolone (MEDROL DOSEPAK) 4 MG tablet therapy pack       ALLERGIES:  No Known Allergies    FAMILY HISTORY:  No family history on file.    SOCIAL HISTORY:   Social History     Socioeconomic History    Marital status: Single   Tobacco Use    Smoking status: Former     Packs/day: 0.50     Years: 8.00     Additional pack years: 0.00     Total pack years: 4.00     Types: Cigarettes     Quit date: 2023     Years since quittin.1     Passive exposure: Current    Smokeless tobacco: Former       VITALS:  BP (!) 151/85   Pulse 98   Temp 97.8  F (36.6  C) (Temporal)   Resp 18   Ht 1.664 m (5' 5.5\")   Wt 100.2 kg (221 lb)   SpO2 100%   BMI 36.22 kg/m      PHYSICAL EXAM:  Physical Exam  Vitals and nursing note reviewed.   Constitutional:       " Appearance: Normal appearance.   HENT:      Head: Normocephalic and atraumatic.      Right Ear: External ear normal.      Left Ear: External ear normal.      Nose: Nose normal.      Mouth/Throat:      Mouth: Mucous membranes are moist.   Eyes:      Extraocular Movements: Extraocular movements intact.      Conjunctiva/sclera: Conjunctivae normal.      Pupils: Pupils are equal, round, and reactive to light.   Cardiovascular:      Rate and Rhythm: Normal rate and regular rhythm.   Pulmonary:      Effort: Pulmonary effort is normal.      Breath sounds: Normal breath sounds. No wheezing or rales.   Abdominal:      General: Abdomen is flat. There is no distension.      Palpations: Abdomen is soft.      Tenderness: There is no abdominal tenderness. There is no guarding.   Musculoskeletal:         General: Normal range of motion.      Cervical back: Normal range of motion and neck supple.      Right lower leg: No edema.      Left lower leg: No edema.      Comments: Right mid lumbar tenderness to palpation   Lymphadenopathy:      Cervical: No cervical adenopathy.   Skin:     General: Skin is warm and dry.   Neurological:      General: No focal deficit present.      Mental Status: He is alert and oriented to person, place, and time. Mental status is at baseline.      Comments: No gross focal neurologic deficits   Psychiatric:         Mood and Affect: Mood normal.         Behavior: Behavior normal.         Thought Content: Thought content normal.          LAB:  All pertinent labs reviewed and interpreted.  Results for orders placed or performed during the hospital encounter of 03/13/24   CT Abdomen Pelvis w/o Contrast    Impression    IMPRESSION:     1 mm nonobstructing stones in both kidneys. No hydronephrosis.     Basic metabolic panel   Result Value Ref Range    Sodium 136 135 - 145 mmol/L    Potassium 4.5 3.4 - 5.3 mmol/L    Chloride 101 98 - 107 mmol/L    Carbon Dioxide (CO2) 27 22 - 29 mmol/L    Anion Gap 8 7 - 15 mmol/L     Urea Nitrogen 15.9 6.0 - 20.0 mg/dL    Creatinine 0.72 0.67 - 1.17 mg/dL    GFR Estimate >90 >60 mL/min/1.73m2    Calcium 9.6 8.6 - 10.0 mg/dL    Glucose 368 (H) 70 - 99 mg/dL   CBC with platelets and differential   Result Value Ref Range    WBC Count 6.9 4.0 - 11.0 10e3/uL    RBC Count 5.01 4.40 - 5.90 10e6/uL    Hemoglobin 15.1 13.3 - 17.7 g/dL    Hematocrit 44.3 40.0 - 53.0 %    MCV 88 78 - 100 fL    MCH 30.1 26.5 - 33.0 pg    MCHC 34.1 31.5 - 36.5 g/dL    RDW 11.9 10.0 - 15.0 %    Platelet Count 227 150 - 450 10e3/uL    % Neutrophils 43 %    % Lymphocytes 41 %    % Monocytes 9 %    % Eosinophils 6 %    % Basophils 1 %    % Immature Granulocytes 0 %    NRBCs per 100 WBC 0 <1 /100    Absolute Neutrophils 2.9 1.6 - 8.3 10e3/uL    Absolute Lymphocytes 2.8 0.8 - 5.3 10e3/uL    Absolute Monocytes 0.6 0.0 - 1.3 10e3/uL    Absolute Eosinophils 0.4 0.0 - 0.7 10e3/uL    Absolute Basophils 0.1 0.0 - 0.2 10e3/uL    Absolute Immature Granulocytes 0.0 <=0.4 10e3/uL    Absolute NRBCs 0.0 10e3/uL       RADIOLOGY:  Reviewed all pertinent imaging. Please see official radiology report.  CT Abdomen Pelvis w/o Contrast   Final Result   IMPRESSION:       1 mm nonobstructing stones in both kidneys. No hydronephrosis.             Justen Ruth M.D.  Emergency Medicine  [facility] EMERGENCY DEPARTMENT  [address] [address] [phone]  Dept: [phone]       Justen Ruth MD  03/13/24 0711

## 2024-03-13 NOTE — ED TRIAGE NOTES
Pt presents to ED with c/o severe R lower back pain. Slept on it funny on Saturday night and has gotten worse. Hx of L2 vertebrae fx and bulging disk in neck.      Triage Assessment (Adult)       Row Name 03/13/24 0511          Triage Assessment    Airway WDL WDL        Respiratory WDL    Respiratory WDL WDL        Cardiac WDL    Cardiac WDL WDL

## 2024-03-13 NOTE — Clinical Note
Harrison Tolbert was seen and treated in our emergency department on 3/13/2024.  He may return to work on 03/15/2024.       If you have any questions or concerns, please don't hesitate to call.      Justen Ruth MD

## 2024-03-19 ENCOUNTER — OFFICE VISIT (OUTPATIENT)
Dept: FAMILY MEDICINE | Facility: CLINIC | Age: 39
End: 2024-03-19
Payer: COMMERCIAL

## 2024-03-19 ENCOUNTER — MYC MEDICAL ADVICE (OUTPATIENT)
Dept: FAMILY MEDICINE | Facility: CLINIC | Age: 39
End: 2024-03-19

## 2024-03-19 VITALS
BODY MASS INDEX: 34.75 KG/M2 | HEIGHT: 66 IN | WEIGHT: 216.2 LBS | TEMPERATURE: 97.6 F | DIASTOLIC BLOOD PRESSURE: 83 MMHG | SYSTOLIC BLOOD PRESSURE: 125 MMHG | RESPIRATION RATE: 14 BRPM | OXYGEN SATURATION: 100 % | HEART RATE: 79 BPM

## 2024-03-19 DIAGNOSIS — M54.12 CERVICAL RADICULOPATHY: ICD-10-CM

## 2024-03-19 DIAGNOSIS — E11.9 TYPE 2 DIABETES MELLITUS WITHOUT COMPLICATION, WITHOUT LONG-TERM CURRENT USE OF INSULIN (H): Primary | ICD-10-CM

## 2024-03-19 DIAGNOSIS — E78.5 HYPERLIPIDEMIA LDL GOAL <70: ICD-10-CM

## 2024-03-19 DIAGNOSIS — M54.50 ACUTE RIGHT-SIDED LOW BACK PAIN WITHOUT SCIATICA: ICD-10-CM

## 2024-03-19 DIAGNOSIS — F17.200 NICOTINE DEPENDENCE, UNCOMPLICATED, UNSPECIFIED NICOTINE PRODUCT TYPE: ICD-10-CM

## 2024-03-19 LAB
ALBUMIN SERPL BCG-MCNC: 4.5 G/DL (ref 3.5–5.2)
ALP SERPL-CCNC: 96 U/L (ref 40–150)
ALT SERPL W P-5'-P-CCNC: 57 U/L (ref 0–70)
ANION GAP SERPL CALCULATED.3IONS-SCNC: 12 MMOL/L (ref 7–15)
AST SERPL W P-5'-P-CCNC: 35 U/L (ref 0–45)
BILIRUB SERPL-MCNC: 0.3 MG/DL
BUN SERPL-MCNC: 12.9 MG/DL (ref 6–20)
CALCIUM SERPL-MCNC: 9.7 MG/DL (ref 8.6–10)
CHLORIDE SERPL-SCNC: 98 MMOL/L (ref 98–107)
CHOLEST SERPL-MCNC: 295 MG/DL
CREAT SERPL-MCNC: 0.85 MG/DL (ref 0.67–1.17)
CREAT UR-MCNC: 40.6 MG/DL
DEPRECATED HCO3 PLAS-SCNC: 26 MMOL/L (ref 22–29)
EGFRCR SERPLBLD CKD-EPI 2021: >90 ML/MIN/1.73M2
ERYTHROCYTE [DISTWIDTH] IN BLOOD BY AUTOMATED COUNT: 11.7 % (ref 10–15)
FASTING STATUS PATIENT QL REPORTED: YES
GLUCOSE SERPL-MCNC: 354 MG/DL (ref 70–99)
HBA1C MFR BLD: 13.4 % (ref 0–5.6)
HCT VFR BLD AUTO: 49.8 % (ref 40–53)
HDLC SERPL-MCNC: 30 MG/DL
HGB BLD-MCNC: 17.1 G/DL (ref 13.3–17.7)
LDLC SERPL CALC-MCNC: ABNORMAL MG/DL
LDLC SERPL DIRECT ASSAY-MCNC: 193 MG/DL
MCH RBC QN AUTO: 29.8 PG (ref 26.5–33)
MCHC RBC AUTO-ENTMCNC: 34.3 G/DL (ref 31.5–36.5)
MCV RBC AUTO: 87 FL (ref 78–100)
MICROALBUMIN UR-MCNC: <12 MG/L
MICROALBUMIN/CREAT UR: NORMAL MG/G{CREAT}
NONHDLC SERPL-MCNC: 265 MG/DL
PLATELET # BLD AUTO: 249 10E3/UL (ref 150–450)
POTASSIUM SERPL-SCNC: 4.3 MMOL/L (ref 3.4–5.3)
PROT SERPL-MCNC: 7.3 G/DL (ref 6.4–8.3)
RBC # BLD AUTO: 5.74 10E6/UL (ref 4.4–5.9)
SODIUM SERPL-SCNC: 136 MMOL/L (ref 135–145)
TRIGL SERPL-MCNC: 494 MG/DL
WBC # BLD AUTO: 8.1 10E3/UL (ref 4–11)

## 2024-03-19 PROCEDURE — 82043 UR ALBUMIN QUANTITATIVE: CPT | Performed by: PHYSICIAN ASSISTANT

## 2024-03-19 PROCEDURE — 83721 ASSAY OF BLOOD LIPOPROTEIN: CPT | Mod: 59 | Performed by: PHYSICIAN ASSISTANT

## 2024-03-19 PROCEDURE — 80061 LIPID PANEL: CPT | Performed by: PHYSICIAN ASSISTANT

## 2024-03-19 PROCEDURE — 83036 HEMOGLOBIN GLYCOSYLATED A1C: CPT | Performed by: PHYSICIAN ASSISTANT

## 2024-03-19 PROCEDURE — 85027 COMPLETE CBC AUTOMATED: CPT | Performed by: PHYSICIAN ASSISTANT

## 2024-03-19 PROCEDURE — 80053 COMPREHEN METABOLIC PANEL: CPT | Performed by: PHYSICIAN ASSISTANT

## 2024-03-19 PROCEDURE — 36415 COLL VENOUS BLD VENIPUNCTURE: CPT | Performed by: PHYSICIAN ASSISTANT

## 2024-03-19 PROCEDURE — 82570 ASSAY OF URINE CREATININE: CPT | Performed by: PHYSICIAN ASSISTANT

## 2024-03-19 PROCEDURE — 99204 OFFICE O/P NEW MOD 45 MIN: CPT | Performed by: PHYSICIAN ASSISTANT

## 2024-03-19 PROCEDURE — G2211 COMPLEX E/M VISIT ADD ON: HCPCS | Performed by: PHYSICIAN ASSISTANT

## 2024-03-19 RX ORDER — BLOOD-GLUCOSE SENSOR
1 EACH MISCELLANEOUS
Qty: 6 EACH | Refills: 5 | Status: SHIPPED | OUTPATIENT
Start: 2024-03-19 | End: 2024-03-19

## 2024-03-19 RX ORDER — METHOCARBAMOL 500 MG/1
500 TABLET, FILM COATED ORAL 4 TIMES DAILY PRN
Qty: 30 TABLET | Refills: 0 | Status: SHIPPED | OUTPATIENT
Start: 2024-03-19 | End: 2024-10-02

## 2024-03-19 RX ORDER — METFORMIN HCL 500 MG
1000 TABLET, EXTENDED RELEASE 24 HR ORAL 2 TIMES DAILY WITH MEALS
Qty: 360 TABLET | Refills: 0 | Status: SHIPPED | OUTPATIENT
Start: 2024-03-19

## 2024-03-19 RX ORDER — PROCHLORPERAZINE 25 MG/1
SUPPOSITORY RECTAL
Qty: 1 EACH | Refills: 0 | Status: SHIPPED | OUTPATIENT
Start: 2024-03-19 | End: 2024-03-19

## 2024-03-19 RX ORDER — KETOROLAC TROMETHAMINE 30 MG/ML
1 INJECTION, SOLUTION INTRAMUSCULAR; INTRAVENOUS ONCE
Qty: 1 EACH | Refills: 0 | Status: SHIPPED | OUTPATIENT
Start: 2024-03-19 | End: 2024-03-19

## 2024-03-19 RX ORDER — PROCHLORPERAZINE 25 MG/1
SUPPOSITORY RECTAL
Qty: 1 EACH | Refills: 1 | Status: SHIPPED | OUTPATIENT
Start: 2024-03-19 | End: 2024-03-19

## 2024-03-19 RX ORDER — PROCHLORPERAZINE 25 MG/1
SUPPOSITORY RECTAL
Qty: 3 EACH | Refills: 5 | Status: SHIPPED | OUTPATIENT
Start: 2024-03-19 | End: 2024-03-19

## 2024-03-19 NOTE — PROGRESS NOTES
Assessment & Plan     Type 2 diabetes mellitus without complication, without long-term current use of insulin (H)  Patient has not been on medication for diabetes in the last few years. Has symptoms of hyperglycemia including increased urination, nausea, weight loss. Starting metformin and ozempic at this time. New sensor ordered. See diabetic ed. See me in 3 months.   - HEMOGLOBIN A1C; Future  - Lipid panel reflex to direct LDL Non-fasting; Future  - Albumin Random Urine Quantitative with Creat Ratio; Future  - metFORMIN (GLUCOPHAGE XR) 500 MG 24 hr tablet; Take 2 tablets (1,000 mg) by mouth 2 times daily (with meals)  - semaglutide (OZEMPIC) 2 MG/3ML pen; Inject 0.25 mg Subcutaneous every 7 days for 28 days, THEN 0.5 mg every 7 days for 28 days.  - CBC with platelets; Future  - Comprehensive metabolic panel (BMP + Alb, Alk Phos, ALT, AST, Total. Bili, TP); Future  - Adult Diabetes Education  Referral; Future  - Albumin Random Urine Quantitative with Creat Ratio  - HEMOGLOBIN A1C  - Lipid panel reflex to direct LDL Non-fasting  - CBC with platelets  - Comprehensive metabolic panel (BMP + Alb, Alk Phos, ALT, AST, Total. Bili, TP)  - Continuous Blood Gluc  (FREESTYLE SIMA 3 READER) CARYL; 1 each once for 1 dose Use to read blood sugars per 's instructions.    Acute right-sided low back pain without sciatica  Refilled. Can do physical therapy for back pain.  - methocarbamol (ROBAXIN) 500 MG tablet; Take 1 tablet (500 mg) by mouth 4 times daily as needed for muscle spasms    Cervical radiculopathy  Continue to work with spine.    Nicotine dependence, uncomplicated, unspecified nicotine product type  Discussed. Ordered nicotine gum.   - [place] Quit Partner Ref[place]; Future  - SMOKING CESSATION COUNSELING 3-10 MIN          MED REC REQUIRED  Post Medication Reconciliation Status:  Discharge medications reconciled, continue medications without change  BMI  Estimated body mass index is 34.91 kg/m   "as calculated from the following:    Height as of this encounter: 1.676 m (5' 5.98\").    Weight as of this encounter: 98.1 kg (216 lb 3.2 oz).   Weight management plan: Discussed healthy diet and exercise guidelines    The longitudinal plan of care for the diagnosis(es)/condition(s) as documented were addressed during this visit. Due to the added complexity in care, I will continue to support Harrison in the subsequent management and with ongoing continuity of care.          Subjective   Harrison is a 38 year old, presenting for the following health issues:  ER F/U        3/19/2024     8:40 AM   Additional Questions   Roomed by An V.         3/19/2024     8:40 AM   Patient Reported Additional Medications   Patient reports taking the following new medications none     Via the Health Maintenance questionnaire, the patient has reported the following services have been completed -Eye Exam, this information has been sent to the abstraction team.  HPI       ED/ Followup:    Facility:  [facility] ER   Date of visit: 03/08/2024, 03/13/2024  Reason for visit: 03/08/2024- Cellulitis, 03/13/2024-Back pain  Current Status: Cellulitis improving, stills having back pain and pain level today at 7/10      Patient is new to me today. He presents for an ED follow up, but also to establish care.    Diagnosis of diabetes 3 years ago. Has been on Victoza and metformin in the past. Not well controlled in the past. Thinks his A1c was 11%. Hasn't been on medication in the last 9-12 months as he hasn't had insurance. Is aware his diet isn't great.   Currently urinating frequently, has had significant weight loss recently.     Seeing Martha Esqueda PA-C for cervical radiculopathy. Right sided arm symptoms. Getting injection soon.    H/o L2 vertebral fracture 10 yrs ago. Still has low back pain. Using methocarbamol PRN. Would like a refill.     Working - . Is currently on light duty for neck issues. " "    Smoker. Smoking 3-4/day. Has tried chantix, patches in the past. Would be interested in gum.        Review of Systems  Constitutional, HEENT, cardiovascular, pulmonary, gi and gu systems are negative, except as otherwise noted.      Objective    /83   Pulse 79   Temp 97.6  F (36.4  C) (Oral)   Resp 14   Ht 1.676 m (5' 5.98\")   Wt 98.1 kg (216 lb 3.2 oz)   SpO2 100%   BMI 34.91 kg/m    Body mass index is 34.91 kg/m .  Physical Exam   GENERAL: alert and no distress  NECK: no adenopathy, no asymmetry, masses, or scars  RESP: lungs clear to auscultation - no rales, rhonchi or wheezes  CV: regular rate and rhythm, normal S1 S2, no S3 or S4, no murmur, click or rub, no peripheral edema  ABDOMEN: soft, nontender, no hepatosplenomegaly, no masses and bowel sounds normal  MS: no gross musculoskeletal defects noted, no edema            Signed Electronically by: Hui Amaya PA-C    "

## 2024-03-19 NOTE — PATIENT INSTRUCTIONS
Nicotine Chewing Gum (NICOTINE GUM - BUCCAL)  For quitting smoking.  Brand Name(s): Nicorelief, Nicorette, Thrive  Generic Name: Nicotine  Instructions  Chew the gum when you feel the urge to smoke. Chew very slowly until it tingles, then move it to the space between your cheek and gum. Keep it there until it stops tingling. When the tingle is gone, begin chewing the gum again until the tingle returns. Most of the nicotine will be gone after 30 minutes.  Do not eat or drink for 15 minutes before or during use of the gum.  Store at room temperature away from heat, light, and moisture. Do not keep in the bathroom.  Tell your doctor and pharmacist about all your medicines. Include prescription and over-the-counter medicines, vitamins, and herbal medicines.  Keep using this medicine for the full number of days that it is prescribed. Do not stop the medicine even if you start to feel better.  This medicine contains sodium. If you are on a low sodium diet, consider this as part of your total sodium diet.  If you have been told to follow a low sodium diet, speak to your doctor before using this medicine.  Do not take the medicine more than 24 times during 24 hours.  Cautions  Tell your doctor and pharmacist if you ever had an allergic reaction to a medicine.  Do not use the medication any more than instructed.  Avoid smoking while on this medicine. Smoking may increase your risk for stroke, heart attack, blood clots, high blood pressure, and other diseases of the heart and blood vessels.  Tell the doctor or pharmacist if you are pregnant, planning to be pregnant, or breastfeeding.  Please tell all your doctors and dentists that you are on this medicine before they provide care.  Side Effects  The following is a list of some common side effects from this medicine. Please speak with your doctor about what you should do if you experience these or other side effects.  jaw pain  irritation of mouth and gum tissue  If you have  any of the following side effects, you may be getting too much medicine. Please contact your doctor to let them know about these side effects.  diarrhea  dizziness  rapid heartbeat  nausea and vomiting  weakness  A few people may have an allergic reaction to this medicine. Symptoms can include difficulty breathing, skin rash, itching, swelling, or severe dizziness. If you notice any of these symptoms, seek medical help quickly.  Please speak with your doctor, nurse, or pharmacist if you have any questions about this medicine.  IMPORTANT NOTE: This document tells you briefly how to take your medicine, but it does not tell you all there is to know about it. Your doctor or pharmacist may give you other documents about your medicine. Please talk to them if you have any questions. Always follow their advice.  There is a more complete description of this medicine available in English. Scan this code on your smartphone or tablet or use the web address below. You can also ask your pharmacist for a printout. If you have any questions, please ask your pharmacist.  The display and use of this drug information is subject to Terms of Use.  More information about NICOTINE GUM - BUCCAL      Copyright(c) 2023 [employer]  Selected from data included with permission and copyright by [employer]. This copyrighted material has been downloaded from a licensed data provider and is not for distribution, except as may be authorized by the applicable terms of use.  Conditions of Use: The information in this database is intended to supplement, not substitute for the expertise and judgment of healthcare professionals. The information is not intended to cover all possible uses, directions, precautions, drug interactions or adverse effects nor should it be construed to indicate that use of a particular drug is safe, appropriate or effective for you or anyone else. A healthcare professional should be consulted before taking any  drug, changing any diet or commencing or discontinuing any course of treatment. The display and use of this drug information is subject to express Terms of Use.  Care instructions adapted under license by your healthcare professional. If you have questions about a medical condition or this instruction, always ask your healthcare professional. [employer], Incorporated disclaims any warranty or liability for your use of this information.

## 2024-03-20 RX ORDER — ATORVASTATIN CALCIUM 20 MG/1
20 TABLET, FILM COATED ORAL DAILY
Qty: 90 TABLET | Refills: 3 | Status: SHIPPED | OUTPATIENT
Start: 2024-03-20

## 2024-03-20 NOTE — TELEPHONE ENCOUNTER
Patient called to check status of message.  Updated him that a message was sent to the PA team to initiate PA for his freestyle amber 3  Explained that turnaround time may be 1-2 weeks  Patient verbalized understanding.    Sami Valerio RN  [facility]

## 2024-03-21 ENCOUNTER — RADIOLOGY INJECTION OFFICE VISIT (OUTPATIENT)
Dept: PHYSICAL MEDICINE AND REHAB | Facility: CLINIC | Age: 39
End: 2024-03-21
Payer: MEDICARE

## 2024-03-21 DIAGNOSIS — M54.12 CERVICAL RADICULOPATHY: ICD-10-CM

## 2024-03-21 PROCEDURE — 99207 PR NON-BILLABLE SERV PER CHARTING: CPT | Performed by: PAIN MEDICINE

## 2024-03-21 NOTE — PROGRESS NOTES
Harrison Tolbert is here today for a right C5-6 TFESI injection.  The patient is not able to have the injection today because he is currently on abx for an infection.  The patient will be rescheduled for next week once he has completed the course of abx and is sx free.      No charge for today s visit.

## 2024-03-26 ENCOUNTER — TELEPHONE (OUTPATIENT)
Dept: FAMILY MEDICINE | Facility: CLINIC | Age: 39
End: 2024-03-26
Payer: COMMERCIAL

## 2024-03-26 NOTE — TELEPHONE ENCOUNTER
Prior Authorization Retail Medication Request    Medication/Dose: Continuous Blood Gluc  (FREESTYLE SIMA 3 READER) CARYL,Continuous Blood Gluc Sensor (FREESTYLE SIMA 3 SENSOR) [facility]  Diagnosis and ICD code (if different than what is on RX):  Type 2 diabetes mellitus without complication, without long-term current use of insulin (H) [E11.9]    New/renewal/insurance change PA/secondary ins. PA: NEW PA  Previously Tried and Failed:    Rationale:  PA is needed     Insurance   Primary: MEDICAID [place]   Insurance ID:  [id number]     Secondary (if applicable):  Insurance ID:      Pharmacy Information (if different than what is on RX)  Name:  [facility]  Phone:  [phone]  Fax:[phone]    Ellie Henderson CMA

## 2024-03-26 NOTE — TELEPHONE ENCOUNTER
Central Prior Authorization Team - Phone: [phone]     PA Initiation    Medication: FREESTYLE SIMA 2 READER CARYL  Insurance Company: [place] Medicaid ([facility]) - Phone [phone] Fax [phone]  Pharmacy Filling the Rx: [facility] [address] [place], [place] - [address] [place] [address] [address]  Filling Pharmacy Phone: [phone]  Filling Pharmacy Fax:    Start Date: 3/26/2024

## 2024-03-27 ENCOUNTER — TELEPHONE (OUTPATIENT)
Dept: FAMILY MEDICINE | Facility: CLINIC | Age: 39
End: 2024-03-27
Payer: COMMERCIAL

## 2024-03-27 NOTE — TELEPHONE ENCOUNTER
Central Prior Authorization Team - Phone: [phone]     I have called pharmacy and they have other insurance on file that is not here on patient chart, only [place] Medcia[place] was sent with this PA request by [place], Medicaid denied.. Patient has [place] Medicare part D plan.    Please see new encounter created to track new PA through other insurance. Thank you.

## 2024-03-27 NOTE — TELEPHONE ENCOUNTER
Central Prior Authorization Team - Phone: [phone]     PRIOR AUTHORIZATION DENIED *calling pharmacy to get other insurance coverage not on patient chart* PA routed with [place] Medicaid info only.    Medication: FREESTYLE SIMA 2 READER CARYL  Insurance Company: [place] Medicaid ([facility] - Phone [phone] Fax [phone]  Denial Date: 3/26/2024    Denial Reason(s):           Appeal Information:      If the provider would like to appeal, please provide a letter of medical necessity and route back to the team. Otherwise you can close the encounter. Thank you, Central PA Team    Patient Notified: NO  Unfortunately, we cannot call the patient with denials because we do not know what next steps the MD will take nor can we give medical advice, please notify the patient of what they are to expect for the continuation of their therapy from the provider.

## 2024-03-27 NOTE — TELEPHONE ENCOUNTER
Central Prior Authorization Team - Phone: [phone]     PA Initiation    Medication: FREESTYLE SIMA 2 READER CARYL  Insurance Company: Comment:  [facility]  Pharmacy Filling the Rx: [id number] - [place], [place] - [address] [place] [address] [address]  Filling Pharmacy Phone: [phone]  Filling Pharmacy Fax:    Start Date: 3/27/2024

## 2024-03-27 NOTE — TELEPHONE ENCOUNTER
Central Prior Authorization Team - Phone: [phone]     Prior Authorization Retail Medication Request    Medication/Dose: FreeStyle Dick 2 Braddock Device- PA to pt Medicare part D plan  Diagnosis and ICD code (if different than what is on RX):  Type 2 diabetes mellitus without complication, without long-term current use of insulin (H) [E11.9]     New/renewal/insurance change PA/secondary ins. PA:  Previously Tried and Failed:    Rationale:      Insurance   Primary: [place]  Insurance ID:  [id number]    Secondary (if applicable[place] (already [place]-must bill Medicare part D)  Insurance ID:      Pharmacy Information (if different than what is on RX)  Name:    [facility] [address] [place] [address]    Phone:  [phone]   Fax:

## 2024-03-28 RX ORDER — LANCETS
EACH MISCELLANEOUS
Qty: 100 EACH | Refills: 6 | Status: SHIPPED | OUTPATIENT
Start: 2024-03-28

## 2024-03-28 NOTE — TELEPHONE ENCOUNTER
Central Prior Authorization Team - Phone: [phone]     PRIOR AUTHORIZATION DENIED    Medication: FREESTYLE SIMA 2 READER CARYL  Insurance Company: Comment:  [employer]  Denial Date: 3/27/2024    Denial Reason(s):           Appeal Information: If the provider would like to appeal, please provide a letter of medical necessity and route back to the team. Otherwise you can close the encounter. Thank you, Central PA Team    Patient Notified: NO  Unfortunately, we cannot call the patient with denials because we do not know what next steps the MD will take nor can we give medical advice, please notify the patient of what they are to expect for the continuation of their therapy from the provider.

## 2024-04-04 ENCOUNTER — RADIOLOGY INJECTION OFFICE VISIT (OUTPATIENT)
Dept: PHYSICAL MEDICINE AND REHAB | Facility: CLINIC | Age: 39
End: 2024-04-04
Payer: COMMERCIAL

## 2024-04-04 VITALS
HEART RATE: 76 BPM | DIASTOLIC BLOOD PRESSURE: 68 MMHG | SYSTOLIC BLOOD PRESSURE: 139 MMHG | OXYGEN SATURATION: 98 % | TEMPERATURE: 97.9 F

## 2024-04-04 DIAGNOSIS — M54.12 CERVICAL RADICULOPATHY: Primary | ICD-10-CM

## 2024-04-04 DIAGNOSIS — E11.9 TYPE 2 DIABETES MELLITUS WITHOUT COMPLICATION, WITHOUT LONG-TERM CURRENT USE OF INSULIN (H): Primary | ICD-10-CM

## 2024-04-04 DIAGNOSIS — F41.9 ANXIETY: ICD-10-CM

## 2024-04-04 LAB — GLUCOSE SERPL-MCNC: 98 MG/DL (ref 70–99)

## 2024-04-04 PROCEDURE — 82962 GLUCOSE BLOOD TEST: CPT | Performed by: PAIN MEDICINE

## 2024-04-04 RX ORDER — DIAZEPAM 5 MG
TABLET ORAL
Qty: 2 TABLET | Refills: 0 | Status: SHIPPED | OUTPATIENT
Start: 2024-04-04 | End: 2024-10-02

## 2024-04-04 ASSESSMENT — PAIN SCALES - GENERAL: PAINLEVEL: SEVERE PAIN (7)

## 2024-04-04 NOTE — PATIENT INSTRUCTIONS
Please reschedule your injection with Dr. Wagner.  A message will be sent to the neurosurgery team to request oral sedation to be taken prior to your next injection appointment.    Please contact the [facility] Care Navigation Team at #[phone] if you have any questions or concerns in the meantime.

## 2024-04-04 NOTE — PROGRESS NOTES
Harrison Tolbert is here today for a right C5-6 epidural steroid injection with Dr. Wagner.  The patient elected to not have the injection today because of anxiety related to the procedure.  The patient will be rescheduled for a later date when he can be pre-medicated with oral sedation.  A message was sent to the [facility] Neurosurgery team to request a prescription.      The patient has been rescheduled for his procedure on 4/17/24.    No charge for today s visit.

## 2024-04-04 NOTE — PROGRESS NOTES
Patient was scheduled for the right C5-6 TFESI today.  He elected to not move forward with the procedure due to procedural anxiety he was having.       Contacted by pain clinic. Will prescribe an oral sedative. Patients injection is rescheduled for 4/17.     Martha Esqueda PA-C  [facility] Neurosurgery  [address] [place] [phone]

## 2024-04-05 ENCOUNTER — TELEPHONE (OUTPATIENT)
Dept: PHYSICAL MEDICINE AND REHAB | Facility: CLINIC | Age: 39
End: 2024-04-05

## 2024-04-05 NOTE — TELEPHONE ENCOUNTER
[facility] Call Center    Phone Message    May a detailed message be left on voicemail: yes     Reason for Call: Other: Patient is calling to confirm that he is eligible for MA and has selected [facility] PMAP.  He has the letter from [facility] with the new MA info at home and can provide it when he has it in hand either to [facility] via ihiji or by calling it in.  Thanks.      Action Taken: Message routed to:  [facility]    Travel Screening: Not Applicable

## 2024-04-24 DIAGNOSIS — E11.9 TYPE 2 DIABETES MELLITUS WITHOUT COMPLICATION, WITHOUT LONG-TERM CURRENT USE OF INSULIN (H): ICD-10-CM

## 2024-04-25 ENCOUNTER — MYC REFILL (OUTPATIENT)
Dept: FAMILY MEDICINE | Facility: CLINIC | Age: 39
End: 2024-04-25
Payer: COMMERCIAL

## 2024-04-25 DIAGNOSIS — E11.9 TYPE 2 DIABETES MELLITUS WITHOUT COMPLICATION, WITHOUT LONG-TERM CURRENT USE OF INSULIN (H): ICD-10-CM

## 2024-04-25 RX ORDER — SEMAGLUTIDE 0.68 MG/ML
0.5 INJECTION, SOLUTION SUBCUTANEOUS
Qty: 5 ML | Refills: 0 | OUTPATIENT
Start: 2024-04-25

## 2024-04-25 RX ORDER — SEMAGLUTIDE 0.68 MG/ML
0.5 INJECTION, SOLUTION SUBCUTANEOUS
Qty: 5 ML | Refills: 0 | Status: SHIPPED | OUTPATIENT
Start: 2024-04-25 | End: 2024-05-22

## 2024-05-19 ENCOUNTER — HEALTH MAINTENANCE LETTER (OUTPATIENT)
Age: 39
End: 2024-05-19

## 2024-05-22 DIAGNOSIS — E11.9 TYPE 2 DIABETES MELLITUS WITHOUT COMPLICATION, WITHOUT LONG-TERM CURRENT USE OF INSULIN (H): ICD-10-CM

## 2024-05-22 RX ORDER — SEMAGLUTIDE 0.68 MG/ML
INJECTION, SOLUTION SUBCUTANEOUS
Qty: 5 ML | Refills: 0 | Status: SHIPPED | OUTPATIENT
Start: 2024-05-22 | End: 2024-06-27

## 2024-06-26 DIAGNOSIS — E11.9 TYPE 2 DIABETES MELLITUS WITHOUT COMPLICATION, WITHOUT LONG-TERM CURRENT USE OF INSULIN (H): ICD-10-CM

## 2024-06-27 RX ORDER — SEMAGLUTIDE 0.68 MG/ML
INJECTION, SOLUTION SUBCUTANEOUS
Qty: 5 ML | Refills: 0 | Status: SHIPPED | OUTPATIENT
Start: 2024-06-27

## 2024-07-28 ENCOUNTER — HEALTH MAINTENANCE LETTER (OUTPATIENT)
Age: 39
End: 2024-07-28

## 2024-09-11 ENCOUNTER — TRANSFERRED RECORDS (OUTPATIENT)
Dept: HEALTH INFORMATION MANAGEMENT | Facility: CLINIC | Age: 39
End: 2024-09-11

## 2024-09-16 ENCOUNTER — OFFICE VISIT (OUTPATIENT)
Dept: FAMILY MEDICINE | Facility: CLINIC | Age: 39
End: 2024-09-16
Payer: COMMERCIAL

## 2024-09-16 VITALS
DIASTOLIC BLOOD PRESSURE: 80 MMHG | TEMPERATURE: 97.7 F | RESPIRATION RATE: 16 BRPM | SYSTOLIC BLOOD PRESSURE: 122 MMHG | OXYGEN SATURATION: 99 % | WEIGHT: 212 LBS | BODY MASS INDEX: 34.07 KG/M2 | HEIGHT: 66 IN | HEART RATE: 118 BPM

## 2024-09-16 DIAGNOSIS — L72.0 CYST OF SKIN AND SUBCUTANEOUS TISSUE: Primary | ICD-10-CM

## 2024-09-16 PROCEDURE — 99213 OFFICE O/P EST LOW 20 MIN: CPT | Performed by: NURSE PRACTITIONER

## 2024-09-16 RX ORDER — CEPHALEXIN 500 MG/1
500 CAPSULE ORAL 3 TIMES DAILY
Qty: 21 CAPSULE | Refills: 0 | Status: SHIPPED | OUTPATIENT
Start: 2024-09-16

## 2024-09-16 RX ORDER — PSEUDOEPHED/ACETAMINOPH/DIPHEN 30MG-500MG
500 TABLET ORAL
COMMUNITY
Start: 2024-08-25

## 2024-09-16 ASSESSMENT — PAIN SCALES - GENERAL: PAINLEVEL: WORST PAIN (10)

## 2024-09-16 NOTE — LETTER
2024    Harrison Tolbert   1985        To Whom it May Concern;    Please excuse Harrison Tolbert from work/school for a healthcare visit on Sep 16, 2024.    Sincerely,        JOSE Elias CNP

## 2024-09-16 NOTE — LETTER
September 16, 2024      Harrison Tolbert  [id number] [facility] [address]        To Whom It May Concern:    Harrison Tolbert  was seen on 9/16/24. Please excuse him  until 9/18/24 due to pain from skin infection.        Sincerely,        JOSE Elias CNP

## 2024-09-16 NOTE — PROGRESS NOTES
"  Assessment & Plan     Cyst of skin and subcutaneous tissue    - Adult Gen Surg  Referral; Future  - cephALEXin (KEFLEX) 500 MG capsule; Take 1 capsule (500 mg) by mouth 3 times daily.  Discussed how to take the medication(s), expected outcomes, potential side effects.          BMI  Estimated body mass index is 34.22 kg/m  as calculated from the following:    Height as of this encounter: 1.676 m (5' 6\").    Weight as of this encounter: 96.2 kg (212 lb).         See Patient Instructions  Patient Instructions   Warm packs to area.  Tylenol and Ibuprofen at same time for pain.  Start the antibiotics.  Follow up with surgery.      When you are out of refills or the refills say \"zero\", it is time to schedule your next appointment in clinic!    Labs are released to you almost immediately and sometimes before I have had a chance to review them.  I review labs regularly and once they are all in, you will either be sent a letter with your results or if you are signed up for on-line services, you will be notified that results are available to you on [employer]. If there are serious findings, you typically will be called.    If you have any questions about your visit, your symptoms, your medication, your test results or it is not clear what your diagnosis or treatment plan is please contact me (via [employer]) or call the care team at [phone] and say \"Care Team\"            Neville Terry is a 39 year old, presenting for the following health issues:  Cyst      9/16/2024     2:05 PM   Additional Questions   Roomed by      Via the Health Maintenance questionnaire, the patient has reported the following services have been completed -Eye Exam: [facility] 2004-05-11, this information has been sent to the abstraction team.  History of Present Illness       Reason for visit:  Big bump on the line of butt crack   He is taking medications regularly.         He states he has another sore on his skin. States he gets " "these a lot and doesn't know why. Previously has had some drained and then was told to see surgeon.  This one is on his left buttocks and has been present for about 2 days. It's creating a lot of pain. He needs note for missed work. He is a  for plastics.          Review of Systems  Constitutional, HEENT, cardiovascular, pulmonary, gi and gu systems are negative, except as otherwise noted.      Objective    /80   Pulse 118   Temp 97.7  F (36.5  C)   Resp 16   Ht 1.676 m (5' 6\")   Wt 96.2 kg (212 lb)   SpO2 99%   BMI 34.22 kg/m    Body mass index is 34.22 kg/m .  Physical Exam   GENERAL: healthy, alert and no distress  NECK: no asymmetry  RESP: lungs clear to auscultation - no rales, rhonchi or wheezes  CV: regular rate and rhythm, normal S1 S2, no S3 or S4, no murmur  ABDOMEN: soft  SKIN: left buttocks with a large area, approximately 2 x 1 inches, of erythema and firmness and pain under skin, c/w cyst  MS: no gross musculoskeletal defects noted              Signed Electronically by: JOSE Elias CNP    "

## 2024-09-16 NOTE — PATIENT INSTRUCTIONS
"Warm packs to area.  Tylenol and Ibuprofen at same time for pain.  Start the antibiotics.  Follow up with surgery.      When you are out of refills or the refills say \"zero\", it is time to schedule your next appointment in clinic!    Labs are released to you almost immediately and sometimes before I have had a chance to review them.  I review labs regularly and once they are all in, you will either be sent a letter with your results or if you are signed up for on-line services, you will be notified that results are available to you on [employer]. If there are serious findings, you typically will be called.    If you have any questions about your visit, your symptoms, your medication, your test results or it is not clear what your diagnosis or treatment plan is please contact me (via [employer]) or call the care team at [phone] and say \"Care Team\"          "

## 2024-09-17 ENCOUNTER — OFFICE VISIT (OUTPATIENT)
Dept: SURGERY | Facility: CLINIC | Age: 39
End: 2024-09-17
Attending: NURSE PRACTITIONER
Payer: COMMERCIAL

## 2024-09-17 VITALS
HEIGHT: 66 IN | SYSTOLIC BLOOD PRESSURE: 149 MMHG | BODY MASS INDEX: 34.08 KG/M2 | WEIGHT: 212.08 LBS | HEART RATE: 109 BPM | TEMPERATURE: 98 F | DIASTOLIC BLOOD PRESSURE: 91 MMHG

## 2024-09-17 DIAGNOSIS — L02.31 ABSCESS OF BUTTOCK, RIGHT: Primary | ICD-10-CM

## 2024-09-17 DIAGNOSIS — L72.0 CYST OF SKIN AND SUBCUTANEOUS TISSUE: ICD-10-CM

## 2024-09-17 PROCEDURE — 99243 OFF/OP CNSLTJ NEW/EST LOW 30: CPT | Performed by: STUDENT IN AN ORGANIZED HEALTH CARE EDUCATION/TRAINING PROGRAM

## 2024-09-17 ASSESSMENT — PAIN SCALES - GENERAL: PAINLEVEL: WORST PAIN (10)

## 2024-09-17 NOTE — PATIENT INSTRUCTIONS
1. Drainage of buttock abscess recommended, declined by patient  2. Continue antibiotics as prescribed  3. Seek care in emergency department if symptoms of worsening pain, fevers, chills  4. If continued symptoms after 1 week, return to surgery clinic for re-evaluation and possible abscess drainage

## 2024-09-17 NOTE — PROGRESS NOTES
Surgical Consultation/History and Physical  [facility]    Harrison is seen in consultation for right buttock cyst, at the request of Hui Amaya PA-C.    Chief Complaint:  right buttock pain    History of Present Illness: Harrison Tolbert is a 39 year old male presents with 3 days  of worsening pain in his right buttock. He states this has made sitting difficult. No associated fevers or chills, no nausea or emesis. He states he has had a similar cyst on his buttocks in the past which was previously excised. Yesterday he was seen by his family practice provider and was prescribed a course of antibiotics. He is very apprehensive about needles and any surgical procedure.     Patient Active Problem List   Diagnosis    Type 2 diabetes mellitus without complication, without long-term current use of insulin (H)       History reviewed. No pertinent past medical history.    Past Surgical History:   Procedure Laterality Date    HAND SURGERY         History reviewed. No pertinent family history.    Social History     Tobacco Use    Smoking status: Every Day     Current packs/day: 0.00     Average packs/day: 0.5 packs/day for 8.0 years (4.0 ttl pk-yrs)     Types: Cigarettes     Start date: 2015     Last attempt to quit: 2023     Years since quittin.7     Passive exposure: Current    Smokeless tobacco: Former   Substance Use Topics    Alcohol use: Not Currently        History   Drug Use Unknown       Current Outpatient Medications   Medication Sig Dispense Refill    acetaminophen (TYLENOL) 500 MG tablet 500 mg.      atorvastatin (LIPITOR) 20 MG tablet Take 1 tablet (20 mg) by mouth daily 90 tablet 3    blood glucose (NO BRAND SPECIFIED) test strip Use to test blood sugar 1 times daily or as directed. To accompany: Blood Glucose Monitor Brands: per insurance. 100 strip 6    blood glucose monitoring (NO BRAND SPECIFIED) meter device kit Use to test blood sugar 1 times daily or as  "directed. Preferred blood glucose meter OR supplies to accompany: Blood Glucose Monitor Brands: per insurance. 1 kit 0    cephALEXin (KEFLEX) 500 MG capsule Take 1 capsule (500 mg) by mouth 3 times daily. 21 capsule 0    diazepam (VALIUM) 5 MG tablet Take 1 tablet 30 minutes prior to procedure, can take 1 additional tablet immediately prior to procedure if needed (Patient not taking: Reported on 9/16/2024) 2 tablet 0    metFORMIN (GLUCOPHAGE XR) 500 MG 24 hr tablet Take 2 tablets (1,000 mg) by mouth 2 times daily (with meals) 360 tablet 0    methocarbamol (ROBAXIN) 500 MG tablet Take 1 tablet (500 mg) by mouth 4 times daily as needed for muscle spasms (Patient not taking: Reported on 9/16/2024) 30 tablet 0    OZEMPIC, 0.25 OR 0.5 MG/DOSE, 2 MG/3ML pen INJECT 0.5MG SUBCUTANEOUSLY ONCE A WEEK 5 mL 0    thin (NO BRAND SPECIFIED) lancets Use with lanceting device. To accompany: Blood Glucose Monitor Brands: per insurance. 100 each 6       No Known Allergies    Review of Systems:   10 point ROS negative other than what was listed in HPI    Physical Exam:  BP (!) 149/91 (BP Location: Left arm, Patient Position: Sitting, Cuff Size: Adult Regular)   Pulse 109   Temp 98  F (36.7  C) (Tympanic)   Ht 1.676 m (5' 5.98\")   Wt 96.2 kg (212 lb 1.3 oz)   BMI 34.25 kg/m      Constitutional- No acute distress, well nourished, non-toxic  Eyes: Anicteric, no injection.  PERRL  ENT:  Normocephalic, atraumatic, Nose midline, moist mucus membranes  Neck - supple, no LAD  Respiratory- Nonlabored breathing on room air  Cardiovascular - Extremities warm and well perfused.  Abdomen - Soft, non-tender, no hepatosplenomegaly, no palpable masses  Rectal - External exam with 2 cm area of erythema, induration, and fluctuance on right buttock, concerning for abscess.   Neuro - No focal neuro deficits, Alert and oriented x 3  Psych: Appropriate mood and affect  Musculoskeletal: Normal gait, symmetric strength.  FROM upper and lower " extremities.    Assessment:  1. Harrison Tolbert is a 39 year old male who presents to surgery clinic with painful swelling of his right buttock. Exam shows an abscess on his right buttock.  I discussed treatment options with the patient and recommended surgical incision and drainage under local anesthesia here in surgery clinic.  I explained that this would be the fastest way to achieve improved pain control as well as alleviate pressure in the area.  Patient expressed understanding and declined, stating that he would like to avoid any needles or procedural intervention.  He states that he has had these in the past and antibiotics have been partially successful and he is hoping that this new prescribed course of antibiotics will help with symptom control, noting that he only received his prescription yesterday.    Per patient request, will not proceed with incision and drainage of buttock abscess today.  Patient was advised to watch for any fevers or worsening pain, which should prompt him to seek care in the emergency department for possible drainage.  If symptoms continue to persist after 1 week, patient was invited to return to surgery clinic for reconsideration of abscess drainage.    Plan:   1. Drainage of buttock abscess recommended, declined by patient  2. Continue antibiotics as prescribed  3. Seek care in emergency department if symptoms of worsening pain, fevers, chills  4. If continued symptoms after 1 week, return to surgery clinic for re-evaluation and possible abscess drainage        Mani Garcia MD on 9/17/2024 at 3:19 PM

## 2024-09-17 NOTE — NURSING NOTE
"Initial BP (!) 149/91 (BP Location: Left arm, Patient Position: Sitting, Cuff Size: Adult Regular)   Pulse 109   Temp 98  F (36.7  C) (Tympanic)   Ht 1.676 m (5' 5.98\")   Wt 96.2 kg (212 lb 1.3 oz)   BMI 34.25 kg/m   Estimated body mass index is 34.25 kg/m  as calculated from the following:    Height as of this encounter: 1.676 m (5' 5.98\").    Weight as of this encounter: 96.2 kg (212 lb 1.3 oz). .  Kirsten Hendrickson MA    "

## 2024-09-17 NOTE — LETTER
2024      Harrison Tolbert  [address] [address] [place] [id number]      Dear Colleague,    Thank you for referring your patient, Harrison Tolbert, to the [facility]. Please see a copy of my visit note below.    Surgical Consultation/History and Physical  [facility] Surgery    Harrison is seen in consultation for right buttock cyst, at the request of Hui Amaya PA-C.    Chief Complaint:  right buttock pain    History of Present Illness: Harrison Tolbert is a 39 year old male presents with 3 days  of worsening pain in his right buttock. He states this has made sitting difficult. No associated fevers or chills, no nausea or emesis. He states he has had a similar cyst on his buttocks in the past which was previously excised. Yesterday he was seen by his family practice provider and was prescribed a course of antibiotics. He is very apprehensive about needles and any surgical procedure.     Patient Active Problem List   Diagnosis     Type 2 diabetes mellitus without complication, without long-term current use of insulin (H)       History reviewed. No pertinent past medical history.    Past Surgical History:   Procedure Laterality Date     HAND SURGERY         History reviewed. No pertinent family history.    Social History     Tobacco Use     Smoking status: Every Day     Current packs/day: 0.00     Average packs/day: 0.5 packs/day for 8.0 years (4.0 ttl pk-yrs)     Types: Cigarettes     Start date: 2015     Last attempt to quit: 2023     Years since quittin.7     Passive exposure: Current     Smokeless tobacco: Former   Substance Use Topics     Alcohol use: Not Currently        History   Drug Use Unknown       Current Outpatient Medications   Medication Sig Dispense Refill     acetaminophen (TYLENOL) 500 MG tablet 500 mg.       atorvastatin (LIPITOR) 20 MG tablet Take 1 tablet (20 mg) by mouth daily 90 tablet 3     blood glucose (NO BRAND  "SPECIFIED) test strip Use to test blood sugar 1 times daily or as directed. To accompany: Blood Glucose Monitor Brands: per insurance. 100 strip 6     blood glucose monitoring (NO BRAND SPECIFIED) meter device kit Use to test blood sugar 1 times daily or as directed. Preferred blood glucose meter OR supplies to accompany: Blood Glucose Monitor Brands: per insurance. 1 kit 0     cephALEXin (KEFLEX) 500 MG capsule Take 1 capsule (500 mg) by mouth 3 times daily. 21 capsule 0     diazepam (VALIUM) 5 MG tablet Take 1 tablet 30 minutes prior to procedure, can take 1 additional tablet immediately prior to procedure if needed (Patient not taking: Reported on 9/16/2024) 2 tablet 0     metFORMIN (GLUCOPHAGE XR) 500 MG 24 hr tablet Take 2 tablets (1,000 mg) by mouth 2 times daily (with meals) 360 tablet 0     methocarbamol (ROBAXIN) 500 MG tablet Take 1 tablet (500 mg) by mouth 4 times daily as needed for muscle spasms (Patient not taking: Reported on 9/16/2024) 30 tablet 0     OZEMPIC, 0.25 OR 0.5 MG/DOSE, 2 MG/3ML pen INJECT 0.5MG SUBCUTANEOUSLY ONCE A WEEK 5 mL 0     thin (NO BRAND SPECIFIED) lancets Use with lanceting device. To accompany: Blood Glucose Monitor Brands: per insurance. 100 each 6       No Known Allergies    Review of Systems:   10 point ROS negative other than what was listed in HPI    Physical Exam:  BP (!) 149/91 (BP Location: Left arm, Patient Position: Sitting, Cuff Size: Adult Regular)   Pulse 109   Temp 98  F (36.7  C) (Tympanic)   Ht 1.676 m (5' 5.98\")   Wt 96.2 kg (212 lb 1.3 oz)   BMI 34.25 kg/m      Constitutional- No acute distress, well nourished, non-toxic  Eyes: Anicteric, no injection.  PERRL  ENT:  Normocephalic, atraumatic, Nose midline, moist mucus membranes  Neck - supple, no LAD  Respiratory- Nonlabored breathing on room air  Cardiovascular - Extremities warm and well perfused.  Abdomen - Soft, non-tender, no hepatosplenomegaly, no palpable masses  Rectal - External exam with 2 cm " area of erythema, induration, and fluctuance on right buttock, concerning for abscess.   Neuro - No focal neuro deficits, Alert and oriented x 3  Psych: Appropriate mood and affect  Musculoskeletal: Normal gait, symmetric strength.  FROM upper and lower extremities.    Assessment:  1. Harrison Tolbert is a 39 year old male who presents to surgery clinic with painful swelling of his right buttock. Exam shows an abscess on his right buttock.  I discussed treatment options with the patient and recommended surgical incision and drainage under local anesthesia here in surgery clinic.  I explained that this would be the fastest way to achieve improved pain control as well as alleviate pressure in the area.  Patient expressed understanding and declined, stating that he would like to avoid any needles or procedural intervention.  He states that he has had these in the past and antibiotics have been partially successful and he is hoping that this new prescribed course of antibiotics will help with symptom control, noting that he only received his prescription yesterday.    Per patient request, will not proceed with incision and drainage of buttock abscess today.  Patient was advised to watch for any fevers or worsening pain, which should prompt him to seek care in the emergency department for possible drainage.  If symptoms continue to persist after 1 week, patient was invited to return to surgery clinic for reconsideration of abscess drainage.    Plan:   1. Drainage of buttock abscess recommended, declined by patient  2. Continue antibiotics as prescribed  3. Seek care in emergency department if symptoms of worsening pain, fevers, chills  4. If continued symptoms after 1 week, return to surgery clinic for re-evaluation and possible abscess drainage        Mani Garcia MD on 9/17/2024 at 3:19 PM      Again, thank you for allowing me to participate in the care of your patient.        Sincerely,        Mani MOHR  MD [facility]

## 2024-10-02 ENCOUNTER — TELEPHONE (OUTPATIENT)
Dept: FAMILY MEDICINE | Facility: CLINIC | Age: 39
End: 2024-10-02
Payer: COMMERCIAL

## 2024-10-02 DIAGNOSIS — E11.9 TYPE 2 DIABETES MELLITUS WITHOUT COMPLICATION, WITHOUT LONG-TERM CURRENT USE OF INSULIN (H): Primary | ICD-10-CM

## 2024-10-02 NOTE — TELEPHONE ENCOUNTER
Over due for visit for very high blood glucose   Recommend visit with diabetic education within one week   Can discuss CGM then

## 2024-10-02 NOTE — TELEPHONE ENCOUNTER
Spoke with pt and notified. Did not want DM ed number stated he will wait for them to call.     Thanks,  TORRI Hernandez  [facility]

## 2024-10-02 NOTE — TELEPHONE ENCOUNTER
New Medication Request        What medication are you requesting?: Freestyle Dick     Reason for medication request: Diabetic     Have you taken this medication before?: Yes:     Controlled Substance Agreement on file:   CSA -- Patient Level:    CSA: None found at the patient level.         Patient offered an appointment? No    Preferred Pharmacy:   [facility] [address] [place], [place] - [address]  [address]  [facility] [address]  Phone: [phone] Fax: [phone]    [facility] [place], [place] - [address]  [address]  [facility] [address]  Phone: [phone] Fax: [phone] Alternate Fax: [phone]      Could we send this information to you in [facility] or would you prefer to receive a phone call?:   Patient would prefer a phone call   Okay to leave a detailed message?: Yes at Home number on file [phone] (home)

## 2024-10-02 NOTE — TELEPHONE ENCOUNTER
Hui,    I don't see any previous orders for sensors. He is on insulin so would get covered by insurance. Ok for order?    Thanks,  TORRI Hernandez  [facility]

## 2024-12-15 ENCOUNTER — HEALTH MAINTENANCE LETTER (OUTPATIENT)
Age: 39
End: 2024-12-15

## 2025-03-16 ENCOUNTER — HEALTH MAINTENANCE LETTER (OUTPATIENT)
Age: 40
End: 2025-03-16

## 2025-03-17 ENCOUNTER — TELEPHONE (OUTPATIENT)
Dept: FAMILY MEDICINE | Facility: CLINIC | Age: 40
End: 2025-03-17
Payer: COMMERCIAL

## 2025-03-17 ENCOUNTER — MYC REFILL (OUTPATIENT)
Dept: FAMILY MEDICINE | Facility: CLINIC | Age: 40
End: 2025-03-17
Payer: COMMERCIAL

## 2025-03-17 ENCOUNTER — MYC MEDICAL ADVICE (OUTPATIENT)
Dept: FAMILY MEDICINE | Facility: CLINIC | Age: 40
End: 2025-03-17
Payer: COMMERCIAL

## 2025-03-17 DIAGNOSIS — E11.9 TYPE 2 DIABETES MELLITUS WITHOUT COMPLICATION, WITHOUT LONG-TERM CURRENT USE OF INSULIN (H): Primary | ICD-10-CM

## 2025-03-17 DIAGNOSIS — E78.5 HYPERLIPIDEMIA LDL GOAL <70: ICD-10-CM

## 2025-03-17 DIAGNOSIS — E11.9 TYPE 2 DIABETES MELLITUS WITHOUT COMPLICATION, WITHOUT LONG-TERM CURRENT USE OF INSULIN (H): ICD-10-CM

## 2025-03-17 RX ORDER — ATORVASTATIN CALCIUM 20 MG/1
20 TABLET, FILM COATED ORAL DAILY
Qty: 90 TABLET | Refills: 0 | Status: SHIPPED | OUTPATIENT
Start: 2025-03-17

## 2025-03-17 RX ORDER — LANCETS
EACH MISCELLANEOUS
Qty: 100 EACH | Refills: 0 | Status: SHIPPED | OUTPATIENT
Start: 2025-03-17

## 2025-03-17 RX ORDER — SEMAGLUTIDE 0.68 MG/ML
0.5 INJECTION, SOLUTION SUBCUTANEOUS
Qty: 3 ML | Refills: 0 | Status: SHIPPED | OUTPATIENT
Start: 2025-03-17

## 2025-03-17 RX ORDER — METFORMIN HYDROCHLORIDE 500 MG/1
1000 TABLET, EXTENDED RELEASE ORAL 2 TIMES DAILY WITH MEALS
Qty: 120 TABLET | Refills: 0 | Status: SHIPPED | OUTPATIENT
Start: 2025-03-17

## 2025-03-17 NOTE — TELEPHONE ENCOUNTER
Pt due to be seen, but note that message was sent to him today by provider, and pt has read message.     Mery Sheikh RN

## 2025-03-17 NOTE — TELEPHONE ENCOUNTER
Patient Quality Outreach    Patient is due for the following:   Diabetes -  A1C, LDL (Fasting), Eye Exam, Microalbumin, Diabetic Follow-Up Visit, and Foot Exam  Physical Preventive Adult Physical      Topic Date Due    Pneumococcal Vaccine (1 of 2 - PCV) Never done    Hepatitis B Vaccine (1 of 3 - 19+ 3-dose series) Never done    Diptheria Tetanus Pertussis (DTAP/TDAP/TD) Vaccine (1 - Tdap) Never done    Flu Vaccine (1) Never done    COVID-19 Vaccine (1 - 2024-25 season) Never done       Action(s) Taken:   Schedule a office visit for Diabetic Check and  Adult Preventative    Type of outreach:    Sent [employer] message.    Questions for provider review:    None           An Henderson, [facility]  Chart routed to none.

## 2025-03-17 NOTE — LETTER
March 17, 2025    To  Harrison Tolbert  [address] [address] [facility] [address]    Your team at [facility] cares about your health. We have reviewed your chart and based on our findings; we are making the following recommendations to better manage your health.     You are in particular need of attention regarding the following:     Schedule a DIABETIC FOLLOW UP appointment for Office Visit. Patients with diabetes should see their provider regularly.  Schedule a DIABETIC EYE EXAM.  This exam is done with an optometrist, annually. You can schedule this appointment with your eye doctor.  If you need a referral, please let us know.  PREVENTATIVE VISIT: Physical    If you have already completed these items, please contact the clinic via phone or   MyChart so your care team can review and update your records. Thank you for   choosing [facility] for your healthcare needs. For any questions,   concerns, or to schedule an appointment please contact our clinic.    Healthy Regards,      Your [facility] Care Team           Electronically signed

## 2025-03-18 RX ORDER — LANCETS
EACH MISCELLANEOUS
Qty: 100 EACH | Refills: 6 | Status: SHIPPED | OUTPATIENT
Start: 2025-03-18

## 2025-06-29 ENCOUNTER — HEALTH MAINTENANCE LETTER (OUTPATIENT)
Age: 40
End: 2025-06-29

## 2025-07-15 ENCOUNTER — TELEPHONE (OUTPATIENT)
Dept: FAMILY MEDICINE | Facility: CLINIC | Age: 40
End: 2025-07-15

## 2025-07-15 NOTE — TELEPHONE ENCOUNTER
Patient Quality Outreach    Patient is due for the following:   Diabetes -  A1C, LDL (Fasting), Eye Exam, Microalbumin, BP Check, Diabetic Follow-Up Visit, and Foot Exam  Hypertension -  BP check  Physical Preventive Adult Physical      Topic Date Due    Pneumococcal Vaccine (1 of 2 - PCV) Never done    Hepatitis B Vaccine (1 of 3 - 19+ 3-dose series) Never done    Diptheria Tetanus Pertussis (DTAP/TDAP/TD) Vaccine (1 - Tdap) Never done    COVID-19 Vaccine (1 - 2024-25 season) Never done       Action(s) Taken:   Schedule a office visit for Diabetic Check and  Adult Preventative    Type of outreach:    Phone, spoke to patient/parent. Informed patient that he is due for diabetic check and physical. Patient verbally understand but will call back to schedule and appointment.    Questions for provider review:    None         Ellie Henderson, [facility]  Chart routed to None.

## 2025-07-24 RX ORDER — SEMAGLUTIDE 1.34 MG/ML
1 INJECTION, SOLUTION SUBCUTANEOUS
COMMUNITY
Start: 2025-05-07

## 2025-07-24 RX ORDER — EMPAGLIFLOZIN 10 MG/1
10 TABLET, FILM COATED ORAL DAILY
COMMUNITY
Start: 2025-06-26

## 2025-07-28 NOTE — TELEPHONE ENCOUNTER
2nd attempts: Called and left message for patient to return call clinic regarding scheduling an physical and diabetic check.  Ellie Henderson CMA

## 2025-08-10 ENCOUNTER — HEALTH MAINTENANCE LETTER (OUTPATIENT)
Age: 40
End: 2025-08-10